# Patient Record
Sex: FEMALE | Race: WHITE | NOT HISPANIC OR LATINO | Employment: OTHER
[De-identification: names, ages, dates, MRNs, and addresses within clinical notes are randomized per-mention and may not be internally consistent; named-entity substitution may affect disease eponyms.]

---

## 2017-11-03 ENCOUNTER — HISTORIC RESULTS (OUTPATIENT)
Dept: ADMINISTRATIVE | Age: 72
End: 2017-11-03

## 2017-11-03 ENCOUNTER — TRANSFERRED RECORDS (OUTPATIENT)
Dept: HEALTH INFORMATION MANAGEMENT | Facility: CLINIC | Age: 72
End: 2017-11-03

## 2021-06-20 ENCOUNTER — HOSPITAL ENCOUNTER (OUTPATIENT)
Facility: CLINIC | Age: 76
Setting detail: OBSERVATION
Discharge: HOME | End: 2021-06-21
Attending: EMERGENCY MEDICINE | Admitting: STUDENT IN AN ORGANIZED HEALTH CARE EDUCATION/TRAINING PROGRAM
Payer: COMMERCIAL

## 2021-06-20 ENCOUNTER — APPOINTMENT (OUTPATIENT)
Dept: GENERAL RADIOLOGY | Facility: CLINIC | Age: 76
End: 2021-06-20
Attending: EMERGENCY MEDICINE
Payer: COMMERCIAL

## 2021-06-20 DIAGNOSIS — R06.00 DYSPNEA, UNSPECIFIED TYPE: ICD-10-CM

## 2021-06-20 DIAGNOSIS — R53.1 WEAKNESS: ICD-10-CM

## 2021-06-20 DIAGNOSIS — J44.1 COPD EXACERBATION (H): ICD-10-CM

## 2021-06-20 LAB
ALBUMIN SERPL-MCNC: 3.7 G/DL (ref 3.4–5)
ALP SERPL-CCNC: 54 U/L (ref 40–150)
ALT SERPL W P-5'-P-CCNC: 124 U/L (ref 0–50)
ANION GAP SERPL CALCULATED.3IONS-SCNC: 6 MMOL/L (ref 3–14)
AST SERPL W P-5'-P-CCNC: 75 U/L (ref 0–45)
BASOPHILS # BLD AUTO: 0.1 10E9/L (ref 0–0.2)
BASOPHILS NFR BLD AUTO: 0.8 %
BILIRUB SERPL-MCNC: 0.4 MG/DL (ref 0.2–1.3)
BUN SERPL-MCNC: 12 MG/DL (ref 7–30)
CALCIUM SERPL-MCNC: 9.1 MG/DL (ref 8.5–10.1)
CHLORIDE SERPL-SCNC: 105 MMOL/L (ref 94–109)
CO2 SERPL-SCNC: 26 MMOL/L (ref 20–32)
CREAT SERPL-MCNC: 0.78 MG/DL (ref 0.52–1.04)
DIFFERENTIAL METHOD BLD: NORMAL
EOSINOPHIL # BLD AUTO: 0.7 10E9/L (ref 0–0.7)
EOSINOPHIL NFR BLD AUTO: 11.8 %
ERYTHROCYTE [DISTWIDTH] IN BLOOD BY AUTOMATED COUNT: 11.8 % (ref 10–15)
GFR SERPL CREATININE-BSD FRML MDRD: 75 ML/MIN/{1.73_M2}
GLUCOSE SERPL-MCNC: 112 MG/DL (ref 70–99)
HCT VFR BLD AUTO: 44.8 % (ref 35–47)
HGB BLD-MCNC: 14.7 G/DL (ref 11.7–15.7)
IMM GRANULOCYTES # BLD: 0 10E9/L (ref 0–0.4)
IMM GRANULOCYTES NFR BLD: 0.3 %
LABORATORY COMMENT REPORT: NORMAL
LYMPHOCYTES # BLD AUTO: 1.6 10E9/L (ref 0.8–5.3)
LYMPHOCYTES NFR BLD AUTO: 26 %
MCH RBC QN AUTO: 31.8 PG (ref 26.5–33)
MCHC RBC AUTO-ENTMCNC: 32.8 G/DL (ref 31.5–36.5)
MCV RBC AUTO: 97 FL (ref 78–100)
MONOCYTES # BLD AUTO: 0.6 10E9/L (ref 0–1.3)
MONOCYTES NFR BLD AUTO: 8.9 %
NEUTROPHILS # BLD AUTO: 3.2 10E9/L (ref 1.6–8.3)
NEUTROPHILS NFR BLD AUTO: 52.2 %
NRBC # BLD AUTO: 0 10*3/UL
NRBC BLD AUTO-RTO: 0 /100
NT-PROBNP SERPL-MCNC: 67 PG/ML (ref 0–900)
PLATELET # BLD AUTO: 212 10E9/L (ref 150–450)
POTASSIUM SERPL-SCNC: 4.4 MMOL/L (ref 3.4–5.3)
PROT SERPL-MCNC: 8.2 G/DL (ref 6.8–8.8)
RBC # BLD AUTO: 4.62 10E12/L (ref 3.8–5.2)
SARS-COV-2 RNA RESP QL NAA+PROBE: NEGATIVE
SODIUM SERPL-SCNC: 137 MMOL/L (ref 133–144)
SPECIMEN SOURCE: NORMAL
TROPONIN I SERPL-MCNC: <0.015 UG/L (ref 0–0.04)
WBC # BLD AUTO: 6.2 10E9/L (ref 4–11)

## 2021-06-20 PROCEDURE — 85025 COMPLETE CBC W/AUTO DIFF WBC: CPT | Performed by: EMERGENCY MEDICINE

## 2021-06-20 PROCEDURE — 250N000009 HC RX 250: Performed by: STUDENT IN AN ORGANIZED HEALTH CARE EDUCATION/TRAINING PROGRAM

## 2021-06-20 PROCEDURE — 83880 ASSAY OF NATRIURETIC PEPTIDE: CPT | Performed by: EMERGENCY MEDICINE

## 2021-06-20 PROCEDURE — 99220 PR INITIAL OBSERVATION CARE,LEVEL III: CPT | Performed by: STUDENT IN AN ORGANIZED HEALTH CARE EDUCATION/TRAINING PROGRAM

## 2021-06-20 PROCEDURE — 96374 THER/PROPH/DIAG INJ IV PUSH: CPT | Performed by: STUDENT IN AN ORGANIZED HEALTH CARE EDUCATION/TRAINING PROGRAM

## 2021-06-20 PROCEDURE — 96376 TX/PRO/DX INJ SAME DRUG ADON: CPT | Performed by: STUDENT IN AN ORGANIZED HEALTH CARE EDUCATION/TRAINING PROGRAM

## 2021-06-20 PROCEDURE — 94640 AIRWAY INHALATION TREATMENT: CPT

## 2021-06-20 PROCEDURE — 250N000011 HC RX IP 250 OP 636: Performed by: EMERGENCY MEDICINE

## 2021-06-20 PROCEDURE — 87635 SARS-COV-2 COVID-19 AMP PRB: CPT | Performed by: EMERGENCY MEDICINE

## 2021-06-20 PROCEDURE — 250N000009 HC RX 250: Performed by: EMERGENCY MEDICINE

## 2021-06-20 PROCEDURE — 94640 AIRWAY INHALATION TREATMENT: CPT | Performed by: STUDENT IN AN ORGANIZED HEALTH CARE EDUCATION/TRAINING PROGRAM

## 2021-06-20 PROCEDURE — G0378 HOSPITAL OBSERVATION PER HR: HCPCS

## 2021-06-20 PROCEDURE — 80053 COMPREHEN METABOLIC PANEL: CPT | Performed by: EMERGENCY MEDICINE

## 2021-06-20 PROCEDURE — 99285 EMERGENCY DEPT VISIT HI MDM: CPT | Mod: 25 | Performed by: STUDENT IN AN ORGANIZED HEALTH CARE EDUCATION/TRAINING PROGRAM

## 2021-06-20 PROCEDURE — 250N000013 HC RX MED GY IP 250 OP 250 PS 637: Performed by: STUDENT IN AN ORGANIZED HEALTH CARE EDUCATION/TRAINING PROGRAM

## 2021-06-20 PROCEDURE — C9803 HOPD COVID-19 SPEC COLLECT: HCPCS

## 2021-06-20 PROCEDURE — 999N000157 HC STATISTIC RCP TIME EA 10 MIN

## 2021-06-20 PROCEDURE — 96375 TX/PRO/DX INJ NEW DRUG ADDON: CPT | Performed by: STUDENT IN AN ORGANIZED HEALTH CARE EDUCATION/TRAINING PROGRAM

## 2021-06-20 PROCEDURE — 71046 X-RAY EXAM CHEST 2 VIEWS: CPT

## 2021-06-20 PROCEDURE — 84484 ASSAY OF TROPONIN QUANT: CPT | Performed by: EMERGENCY MEDICINE

## 2021-06-20 RX ORDER — METHYLPREDNISOLONE SODIUM SUCCINATE 125 MG/2ML
125 INJECTION, POWDER, LYOPHILIZED, FOR SOLUTION INTRAMUSCULAR; INTRAVENOUS ONCE
Status: COMPLETED | OUTPATIENT
Start: 2021-06-20 | End: 2021-06-20

## 2021-06-20 RX ORDER — ACETAMINOPHEN 650 MG/1
650 SUPPOSITORY RECTAL EVERY 4 HOURS PRN
Status: DISCONTINUED | OUTPATIENT
Start: 2021-06-20 | End: 2021-06-21 | Stop reason: HOSPADM

## 2021-06-20 RX ORDER — ACETAMINOPHEN 325 MG/1
650 TABLET ORAL EVERY 4 HOURS PRN
Status: DISCONTINUED | OUTPATIENT
Start: 2021-06-20 | End: 2021-06-21 | Stop reason: HOSPADM

## 2021-06-20 RX ORDER — ONDANSETRON 2 MG/ML
4 INJECTION INTRAMUSCULAR; INTRAVENOUS EVERY 30 MIN PRN
Status: DISCONTINUED | OUTPATIENT
Start: 2021-06-20 | End: 2021-06-20

## 2021-06-20 RX ORDER — ONDANSETRON 4 MG/1
4 TABLET, ORALLY DISINTEGRATING ORAL EVERY 6 HOURS PRN
Status: DISCONTINUED | OUTPATIENT
Start: 2021-06-20 | End: 2021-06-21 | Stop reason: HOSPADM

## 2021-06-20 RX ORDER — ALBUTEROL SULFATE 90 UG/1
1-2 AEROSOL, METERED RESPIRATORY (INHALATION) EVERY 4 HOURS PRN
COMMUNITY
Start: 2021-06-03

## 2021-06-20 RX ORDER — IPRATROPIUM BROMIDE AND ALBUTEROL SULFATE 2.5; .5 MG/3ML; MG/3ML
3 SOLUTION RESPIRATORY (INHALATION)
Status: DISCONTINUED | OUTPATIENT
Start: 2021-06-20 | End: 2021-06-20

## 2021-06-20 RX ORDER — VITAMIN B COMPLEX
1 TABLET ORAL DAILY
COMMUNITY

## 2021-06-20 RX ORDER — MAGNESIUM OXIDE 400 MG/1
400 TABLET ORAL EVERY EVENING
COMMUNITY

## 2021-06-20 RX ORDER — ONDANSETRON 2 MG/ML
4 INJECTION INTRAMUSCULAR; INTRAVENOUS EVERY 6 HOURS PRN
Status: DISCONTINUED | OUTPATIENT
Start: 2021-06-20 | End: 2021-06-21 | Stop reason: HOSPADM

## 2021-06-20 RX ORDER — IPRATROPIUM BROMIDE AND ALBUTEROL SULFATE 2.5; .5 MG/3ML; MG/3ML
3 SOLUTION RESPIRATORY (INHALATION) EVERY 4 HOURS
Status: DISCONTINUED | OUTPATIENT
Start: 2021-06-21 | End: 2021-06-20

## 2021-06-20 RX ORDER — BUSPIRONE HYDROCHLORIDE 10 MG/1
10 TABLET ORAL 2 TIMES DAILY PRN
COMMUNITY

## 2021-06-20 RX ORDER — PREDNISONE 20 MG/1
TABLET ORAL
Qty: 8 TABLET | Refills: 0 | Status: SHIPPED | OUTPATIENT
Start: 2021-06-20 | End: 2021-06-21

## 2021-06-20 RX ORDER — PREDNISONE 20 MG/1
40 TABLET ORAL DAILY
Status: DISCONTINUED | OUTPATIENT
Start: 2021-06-21 | End: 2021-06-21 | Stop reason: HOSPADM

## 2021-06-20 RX ORDER — IPRATROPIUM BROMIDE AND ALBUTEROL SULFATE 2.5; .5 MG/3ML; MG/3ML
1 SOLUTION RESPIRATORY (INHALATION) EVERY 6 HOURS PRN
Qty: 100 ML | Refills: 0 | Status: SHIPPED | OUTPATIENT
Start: 2021-06-20 | End: 2021-06-21

## 2021-06-20 RX ORDER — AZITHROMYCIN 250 MG/1
500 TABLET, FILM COATED ORAL ONCE
Status: COMPLETED | OUTPATIENT
Start: 2021-06-20 | End: 2021-06-20

## 2021-06-20 RX ORDER — ALBUTEROL SULFATE 90 UG/1
1-2 AEROSOL, METERED RESPIRATORY (INHALATION)
Status: DISCONTINUED | OUTPATIENT
Start: 2021-06-20 | End: 2021-06-21 | Stop reason: HOSPADM

## 2021-06-20 RX ORDER — IPRATROPIUM BROMIDE AND ALBUTEROL SULFATE 2.5; .5 MG/3ML; MG/3ML
3 SOLUTION RESPIRATORY (INHALATION) EVERY 4 HOURS
Status: DISCONTINUED | OUTPATIENT
Start: 2021-06-20 | End: 2021-06-21 | Stop reason: HOSPADM

## 2021-06-20 RX ORDER — ALBUTEROL SULFATE 90 UG/1
1-2 AEROSOL, METERED RESPIRATORY (INHALATION) EVERY 4 HOURS PRN
Status: DISCONTINUED | OUTPATIENT
Start: 2021-06-20 | End: 2021-06-20

## 2021-06-20 RX ORDER — IPRATROPIUM BROMIDE AND ALBUTEROL SULFATE 2.5; .5 MG/3ML; MG/3ML
3 SOLUTION RESPIRATORY (INHALATION) ONCE
Status: COMPLETED | OUTPATIENT
Start: 2021-06-20 | End: 2021-06-20

## 2021-06-20 RX ORDER — AZITHROMYCIN 250 MG/1
250 TABLET, FILM COATED ORAL DAILY
Status: DISCONTINUED | OUTPATIENT
Start: 2021-06-21 | End: 2021-06-21 | Stop reason: HOSPADM

## 2021-06-20 RX ADMIN — Medication 1 MG: at 20:44

## 2021-06-20 RX ADMIN — ONDANSETRON 4 MG: 2 INJECTION INTRAMUSCULAR; INTRAVENOUS at 17:08

## 2021-06-20 RX ADMIN — IPRATROPIUM BROMIDE AND ALBUTEROL SULFATE 3 ML: .5; 3 SOLUTION RESPIRATORY (INHALATION) at 21:33

## 2021-06-20 RX ADMIN — METHYLPREDNISOLONE SODIUM SUCCINATE 125 MG: 125 INJECTION, POWDER, FOR SOLUTION INTRAMUSCULAR; INTRAVENOUS at 15:10

## 2021-06-20 RX ADMIN — ONDANSETRON 4 MG: 2 INJECTION INTRAMUSCULAR; INTRAVENOUS at 14:21

## 2021-06-20 RX ADMIN — ACETAMINOPHEN 650 MG: 325 TABLET, FILM COATED ORAL at 20:47

## 2021-06-20 RX ADMIN — IPRATROPIUM BROMIDE AND ALBUTEROL SULFATE 3 ML: .5; 3 SOLUTION RESPIRATORY (INHALATION) at 14:18

## 2021-06-20 RX ADMIN — AZITHROMYCIN MONOHYDRATE 500 MG: 250 TABLET ORAL at 20:44

## 2021-06-20 ASSESSMENT — MIFFLIN-ST. JEOR: SCORE: 1168.14

## 2021-06-20 ASSESSMENT — ENCOUNTER SYMPTOMS
CHEST TIGHTNESS: 0
SLEEP DISTURBANCE: 1
FATIGUE: 1
SHORTNESS OF BREATH: 1
WEAKNESS: 1
NAUSEA: 1

## 2021-06-20 NOTE — ED TRIAGE NOTES
"Pt has been having SOB for last few weeks but increasingly worse the last few days. Pt states \"any movement makes me lux and puff\".   "

## 2021-06-20 NOTE — PHARMACY-ADMISSION MEDICATION HISTORY
Pharmacy Medication History  Admission medication history interview status for the 6/20/2021  admission is complete. See EPIC admission navigator for prior to admission medications     Location of Interview: Patient room  Medication history sources: Patient, Surescripts and Care Everywhere    Significant changes made to the medication list:  Added albuterol, buspirone, dorzolamide-timolol, calcium, magnesium, vitamin D  Removed: simvastatin, nystatin-triamcinolone, fluocinonide    In the past week, patient estimated taking medication this percent of the time: greater than 90%    Additional medication history information:   None    Medication reconciliation completed by provider prior to medication history? No    Time spent in this activity: 15 minutes     Prior to Admission medications    Medication Sig Last Dose Taking? Auth Provider   albuterol (PROAIR HFA/PROVENTIL HFA/VENTOLIN HFA) 108 (90 Base) MCG/ACT inhaler Inhale 1-2 puffs into the lungs every 4 hours as needed for shortness of breath / dyspnea 6/20/2021 at Unknown time Yes Unknown, Entered By History   busPIRone (BUSPAR) 10 MG tablet Take 10 mg by mouth 2 times daily 6/20/2021 at 5 mg, took half tab Yes Unknown, Entered By History   calcium carbonate (OS-MAMIE) 1500 (600 Ca) MG tablet Take 600 mg by mouth daily 6/20/2021 at Unknown time Yes Unknown, Entered By History   dorzolamide-timolol (COSOPT) 2-0.5 % ophthalmic solution Place 1 drop into both eyes 2 times daily  6/20/2021 at x1 Yes Reported, Patient   ipratropium - albuterol 0.5 mg/2.5 mg/3 mL (DUONEB) 0.5-2.5 (3) MG/3ML neb solution Take 1 vial (3 mLs) by nebulization every 6 hours as needed for shortness of breath / dyspnea or wheezing  Yes Catalino Lloyd MD   latanoprost (XALATAN) 0.005 % ophthalmic solution Place 1 drop into both eyes At Bedtime. 6/19/2021 at Unknown time Yes Reported, Patient   levothyroxine (SSYNTHROID,LEVOTHROID) 100 MCG tablet Take 1 tablet by mouth daily. 6/20/2021 at  Unknown time Yes Leslee Orozco NP   magnesium oxide (MAG-OX) 400 MG tablet Take 400 mg by mouth daily 6/20/2021 at Unknown time Yes Unknown, Entered By History   predniSONE (DELTASONE) 20 MG tablet Take two tablets (= 40mg) each day for 4 (four) days  Yes Catalino Lloyd MD   vitamin D3 (CHOLECALCIFEROL) 50 mcg (2000 units) tablet Take 1 tablet by mouth At Bedtime 6/19/2021 at Unknown time Yes Unknown, Entered By History     The information provided in this note is only as accurate as the sources available at the time of update(s)     Patricia Jade, PharmD, BCPS

## 2021-06-20 NOTE — ED PROVIDER NOTES
History   Chief Complaint:  Shortness of Breath       The history is provided by the patient.      Naina Zamora is a 75 year old female with history of COPD from chronic bronchitis, hypercholesterolemia, hyperlipidemia and hypothyroidism who presents with shortness of breath. She reports worsened shortness of breath yesterday, with difficulty throughout the night. She felt better this morning, but stayed in bed longer than normal because she felt tired and weak. The patient reports shortness of breath coming back today and says that it is much more severe, noting that it is worse when laying flat. She describes difficulty taking deep breaths, but does not report pain, and she also reports some nausea today. The patient reports recent respiratory issues and coughing, for which she was prescribed albuterol a couple weeks ago. She has been using albuterol and it helped for a while, but doesn't seem to be making as much of a difference today. She reports using more albuterol last night, about every couple hours. The patient also describes trying other remedies including Vicks, steam, and lemon and honey water today, but nothing seemed to help. The patient denies pain with breathing, as well as any chest pain or tightness. She mentions liver issues for years from statin, past hepatis A, and possibly drinking, which she has quit for a couple weeks. She reports marijuana use, but denies any tobacco use since quitting smoking around 40 years ago. The patient also notes taking a half tablet of Buspar, prescribed for her anxiety, about an hour or two ago. She reports lower blood pressure being normal, noting 106/56 the other day.       Review of Systems   Constitutional: Positive for fatigue.   Respiratory: Positive for shortness of breath. Negative for chest tightness.    Cardiovascular: Negative for chest pain.   Gastrointestinal: Positive for nausea.   Neurological: Positive for weakness.   Psychiatric/Behavioral:  "Positive for sleep disturbance.   All other systems reviewed and are negative.    Allergies:  Latex  Statins    Medications:  Albuterol  Deltasone  Dorzolamide  Lidex  Xalatan  Synthroid, Levothroid  Zocor  Buspar    Past Medical History:    Eczema  Elevated lipids  Glaucoma  Hypothyroidism  Fatty liver  JORGE  Hypercholesterolemia  Hyperlipidemia  Alcohol abuse  COPD     Past Surgical History:     section  Cholecystectomy  Dilation and curettage     Family History:    CAD  Diabetes  Hypertension  Alzheimer disease    Social History:  The patient present with her daughter and daughter-in-law.  The patient lives alone.    Physical Exam     Patient Vitals for the past 24 hrs:   BP Temp Temp src Pulse Resp SpO2 Height Weight   21 1715 -- -- -- -- -- 94 % -- --   21 1705 -- -- -- -- -- (!) 88 % -- --   21 1615 -- -- -- -- -- 92 % -- --   21 1600 -- -- -- -- -- 91 % -- --   21 1530 (!) 163/75 -- -- -- -- 92 % -- --   21 1310 (!) 167/70 98.1  F (36.7  C) Temporal 67 20 94 % 1.549 m (5' 1\") 72.6 kg (160 lb)       Physical Exam  General: Alert and Interactive.   Head: No signs of trauma.   Mouth/Throat: Oropharynx is clear and moist.   Eyes: Conjunctivae are normal. Pupils are equal, round, and reactive to light.   Neck: Normal range of motion. No nuchal rigidity.   CV: Normal rate and regular rhythm.    Resp: Effort normal and breath sounds normal. No respiratory distress.   GI: Soft. There is no tenderness or guarding.   MSK: Normal range of motion. no edema.   Neuro: The patient is alert and oriented to person, place, and time.  PERRLA, EOMI, strength in upper/lower extremities normal and symmetrical.   Sensation normal. Speech normal.  GCS eye subscore is 4. GCS verbal subscore is 5. GCS motor subscore is 6.   Skin: Skin is warm and dry. No rash noted.   Psych: normal mood and affect. behavior is normal.       Emergency Department Course     Imaging:  XR Chest 2 Views  No " radiographic evidence of acute chest abnormality.   As per radiology.     Laboratory:   CBC: WBC 6.2, HGB 14.7,    CMP: Glucose 112 (H),  (H), AST 75(H) o/w WNL (Creatinine 0.78)    Troponin (Collected 1342): <0.015  BNP: 67    Asymptomatic COVID19 Virus PCR by nasopharyngeal swab: pending      Emergency Department Course:    Reviewed:  I reviewed nursing notes, vitals, past medical history and care everywhere    Assessments:  1319 Yoel Kim, medical student, assessed the patient and reported findings to me.  1432 Yoel rechecked the patient and reported findings.  1530 Yoel rechecked the patient and reported findings.  1555 I assessed the patient. The patient was road tested and became lightheaded, cold, and nauseated. Plan will be for observation for symptom resolution.    Consults:  1714 I spoke with Dr. Tamez, hospitalist, about the patient. They agreed to admit the patient.    Interventions:  1418 DuoNeb 3mL Nebulizer  1421 Zofran 4mg IV  1510 solu-medrol 125mg IV    Disposition:  The patient was admitted to the hospital under the care of Dr. Tamez.       Impression & Plan   Medical Decision Making:   Naina Zamora is a 75 year old female who presents for evaluation of shortness of breath.  Signs and symptoms are consistent with COPD exacerbation.  A broad differential was considered including foreign body, reactive airway disease, pneumothorax, cardiac equivalent/ACS, viral induced wheezing, allergic phenomena, pneumonia, etc.  Patient feels improved after interventions here in ED but continues to have impairment in respiratory function.  Given this, I will hospitalize for further intervention.  There are no signs at this point of any other serious etiologies including those mentioned above especially acute coronary syndrome. I doubt this is ACS given the classic story of COPD exacerbation given by the patient and the marked wheezing.  No signs of pneumonia. Troponin normal here.     Initially had plan to discharge the patient with outpatient steroids and nebulizer for DuoNeb.  However, the patient did not do well ambulating stating that she felt too weak to go home and her family thought that this would be unsafe to send her home at this point.  We will bring her into the hospital under observation for further treatment.    Covid-19  Naina Zamora was evaluated during a global COVID-19 pandemic, which necessitated consideration that the patient might be at risk for infection with the SARS-CoV-2 virus that causes COVID-19.   Applicable protocols for evaluation were followed during the patient's care.   COVID-19 was considered as part of the patient's evaluation. The plan for testing is:  a test was obtained during this visit.    Diagnosis:    ICD-10-CM    1. Dyspnea, unspecified type  R06.00 Asymptomatic SARS-CoV-2 COVID-19 Virus (Coronavirus) by PCR   2. COPD exacerbation (H)  J44.1    3. Weakness  R53.1        Scribe Disclosure:  I, Zofia Rothman, am serving as a scribe at 1:19 PM on 6/20/2021 to document services personally performed by Catalino Lloyd MD based on my observations and the provider's statements to me.        Catalino Lloyd MD  06/20/21 7762

## 2021-06-20 NOTE — ED NOTES
"[facility]  ED Nurse Handoff Report    ED Chief complaint: Shortness of Breath      ED Diagnosis:   Final diagnoses:   Dyspnea, unspecified type   COPD exacerbation (H)       Code Status: Full Code    Allergies:   Allergies   Allergen Reactions     Latex Rash       Patient Story: Pt presents w/SOB. Hx of COPD. Does not have neb machine at home. Was going to discharge pt with neb machine and prescriptions. During ambulation trial pt reports feeling \"woozy\" and \"weak\" and states she does not feel safe going home.   Focused Assessment:  A&Ox4, VSS, ambulates independently without difficulty. Lung sounds diminished. NSR.     Treatments and/or interventions provided: solumedrol.   Patient's response to treatments and/or interventions: NA    To be done/followed up on inpatient unit:  see inpatient orders    Does this patient have any cognitive concerns?: none    Activity level - Baseline/Home:  Independent  Activity Level - Current:   Independent    Patient's Preferred language: English   Needed?: No    Isolation: None  Infection: Not Applicable  Patient tested for COVID 19 prior to admission: yes  Bariatric?: No    Vital Signs:   Vitals:    06/20/21 1310   BP: (!) 167/70   Pulse: 67   Resp: 20   Temp: 98.1  F (36.7  C)   TempSrc: Temporal   SpO2: 94%   Weight: 72.6 kg (160 lb)   Height: 1.549 m (5' 1\")       Cardiac Rhythm:     Was the PSS-3 completed:   Yes  What interventions are required if any?               Family Comments: daughter present  OBS brochure/video discussed/provided to patient/family: Yes              Name of person given brochure if not patient: self              Relationship to patient: NA    For the majority of the shift this patient's behavior was Green.   Behavioral interventions performed were NA.    ED NURSE PHONE NUMBER: [phone]         "

## 2021-06-21 VITALS
HEART RATE: 86 BPM | WEIGHT: 160 LBS | HEIGHT: 61 IN | OXYGEN SATURATION: 93 % | RESPIRATION RATE: 22 BRPM | TEMPERATURE: 98.1 F | SYSTOLIC BLOOD PRESSURE: 130 MMHG | DIASTOLIC BLOOD PRESSURE: 60 MMHG | BODY MASS INDEX: 30.21 KG/M2

## 2021-06-21 PROCEDURE — 999N000157 HC STATISTIC RCP TIME EA 10 MIN

## 2021-06-21 PROCEDURE — 250N000009 HC RX 250: Performed by: STUDENT IN AN ORGANIZED HEALTH CARE EDUCATION/TRAINING PROGRAM

## 2021-06-21 PROCEDURE — 250N000013 HC RX MED GY IP 250 OP 250 PS 637: Performed by: STUDENT IN AN ORGANIZED HEALTH CARE EDUCATION/TRAINING PROGRAM

## 2021-06-21 PROCEDURE — 99217 PR OBSERVATION CARE DISCHARGE: CPT | Performed by: INTERNAL MEDICINE

## 2021-06-21 PROCEDURE — 94640 AIRWAY INHALATION TREATMENT: CPT | Mod: 76

## 2021-06-21 PROCEDURE — 999N000147 HC STATISTIC PT IP EVAL DEFER

## 2021-06-21 PROCEDURE — 250N000012 HC RX MED GY IP 250 OP 636 PS 637: Performed by: STUDENT IN AN ORGANIZED HEALTH CARE EDUCATION/TRAINING PROGRAM

## 2021-06-21 PROCEDURE — G0378 HOSPITAL OBSERVATION PER HR: HCPCS

## 2021-06-21 RX ORDER — PREDNISONE 10 MG/1
TABLET ORAL
Qty: 20 TABLET | Refills: 0 | Status: ON HOLD | OUTPATIENT
Start: 2021-06-21 | End: 2021-07-22

## 2021-06-21 RX ORDER — IPRATROPIUM BROMIDE AND ALBUTEROL SULFATE 2.5; .5 MG/3ML; MG/3ML
1 SOLUTION RESPIRATORY (INHALATION) EVERY 6 HOURS PRN
Qty: 100 ML | Refills: 0 | Status: ON HOLD | OUTPATIENT
Start: 2021-06-21 | End: 2021-07-22

## 2021-06-21 RX ORDER — AZITHROMYCIN 250 MG/1
250 TABLET, FILM COATED ORAL DAILY
Qty: 3 TABLET | Refills: 0 | Status: ON HOLD | OUTPATIENT
Start: 2021-06-22 | End: 2021-07-22

## 2021-06-21 RX ADMIN — IPRATROPIUM BROMIDE AND ALBUTEROL SULFATE 3 ML: .5; 3 SOLUTION RESPIRATORY (INHALATION) at 14:04

## 2021-06-21 RX ADMIN — IPRATROPIUM BROMIDE AND ALBUTEROL SULFATE 3 ML: .5; 3 SOLUTION RESPIRATORY (INHALATION) at 05:56

## 2021-06-21 RX ADMIN — PREDNISONE 40 MG: 20 TABLET ORAL at 07:36

## 2021-06-21 RX ADMIN — AZITHROMYCIN MONOHYDRATE 250 MG: 250 TABLET ORAL at 07:36

## 2021-06-21 RX ADMIN — IPRATROPIUM BROMIDE AND ALBUTEROL SULFATE 3 ML: .5; 3 SOLUTION RESPIRATORY (INHALATION) at 09:54

## 2021-06-21 RX ADMIN — ACETAMINOPHEN 650 MG: 325 TABLET, FILM COATED ORAL at 05:40

## 2021-06-21 NOTE — PLAN OF CARE
PT: Orders received, chart reviewed, discussed with RN in rounds. Pt admitted under observation status with a COPD exacerbation and is now feeling well. Pt ambulated in the hallway with nursing, O2 sats stable, balance was steady and it was observed by PT. No concerns from nursing. Pt has no skilled PT needs at this time. PT to complete orders.

## 2021-06-21 NOTE — PROGRESS NOTES
Patient has been assessed for Home Oxygen needs. Oxygen readings:    *Pulse oximetry (SpO2) = 93% on room air at rest while awake.    *SpO2 improved to 98% on 1 liters/minute at rest.    *SpO2 = 86-92% on room air during activity/with exercise.    *SpO2 improved to 92% on 0 liters/minute during activity/with exercise.

## 2021-06-21 NOTE — H&P
[facility]    History and Physical - Hospitalist Service       Date of Admission:  6/20/2021    Assessment & Plan   Naina Zamora is a 75 year old female with past medical history significant for COBD, HLD, hypothyroidism, former tobacco use admitted on 6/20/2021 with shortness of breath.     Shortness of breath   Probable COPD exacerbation   Generalized weakness   Pt presented to the ED with shortness of breath and weakness. Pt has hx of COPD but no prior hospitalizations for exacerbation. She started having some mild a few weeks ago and was prescribed albuterol. despite the albuterol symptoms progressed. Now quite short of breath the last two days with cough and increased sputum production. O2 sats are in the low 90s on room air. Laboratory evaluation is unremarkable with normal trop and BNP. CXR shows no acute abnormality. Pt is a former smoker and currently smokes marijuana.   - Continue Duobebs Q4H scheduled and albuterol Q2H PRN   - Continue Prednisone 40mg daily   - Azithromycin x5 days  - Continuous oximetry, and supplemental O2 as need   - Respiratory therapy consult   - Smoking cessation  - PT consult     Elevated LFTs   Hx Fatty liver vs alcoholic liver disease   LFTs slightly elevated with ALT of 124 and AST of 75.   - Monitor outpatient     Alcohol use   - monitor for evidence of withdrawal.     Mood disorder   - Buspar PTA, hold while on obs.     Hypothyroidism   - Levothyroxine PTA, hold while on obs.     Diet: Regular Diet Adult    DVT Prophylaxis: Low Risk/Ambulatory with no VTE prophylaxis indicated  Fragoso Catheter: not present  Code Status: Full Code           Disposition Plan   Expected discharge: Tomorrow, recommended to prior living arrangement once safe disposition plan/ TCU bed available and dyspnea improved. .  Entered: Zeynep Tamez MD 06/20/2021, 7:43 PM     The patient's care was discussed with the Patient.    Zeynep Tamez MD  [facility]  [facility]  Contact information available via [facility] Paging/Directory      ______________________________________________________________________    Chief Complaint   Shortness of breath     History is obtained from the patient    History of Present Illness   Naina Zamora is a 75 year old female who presented to the ED with shortness of breath. Her symptoms started yesterday, but has been progressing throughout the night and day today. She also also felt more tired and weak than normal. She has been using her albuterol inhaler, but reports that this has not helped much. She does have a hx of COPD, but denies hx of exacerbations. She has been outside much more this past week than she has in over a year, so thinks that there may be some sort of environmental allergen that is contributing. She has been coughing frequently with sputum production.     In the ED she was hemodynamically stable and satting in the low 90s on room air. Her CXR was normal. Laboratory evaluation was also fairly unremarkable. She was treated with duonebs, and IV solumedrol for possible COPD exacerbating. She reports feeling better after these interventions but still weak and short of breath.     Pt is being admitted to observation for further care. She is feeling better after the duoneb but still feels quite short of breath.     Review of Systems    The 10 point Review of Systems is negative other than noted in the HPI or here.     Past Medical History    I have reviewed this patient's medical history and updated it with pertinent information if needed.   Past Medical History:   Diagnosis Date     Eczema      Elevated lipids      Glaucoma      Hypothyroidism        Past Surgical History   I have reviewed this patient's surgical history and updated it with pertinent information if needed.  Past Surgical History:   Procedure Laterality Date      SECTION       CHOLECYSTECTOMY       DILATION AND CURETTAGE         Social History    I have reviewed this patient's social history and updated it with pertinent information if needed.  Social History     Tobacco Use     Smoking status: Never Smoker     Smokeless tobacco: Never Used   Substance Use Topics     Alcohol use: No     Drug use: No       Family History   I have reviewed this patient's family history and updated it with pertinent information if needed.  Family History   Problem Relation Age of Onset     C.A.D. Brother      C.A.D. Father      Diabetes Brother      Hypertension Brother      Alzheimer Disease Mother        Prior to Admission Medications   Prior to Admission Medications   Prescriptions Last Dose Informant Patient Reported? Taking?   albuterol (PROAIR HFA/PROVENTIL HFA/VENTOLIN HFA) 108 (90 Base) MCG/ACT inhaler 6/20/2021 at Unknown time Self Yes Yes   Sig: Inhale 1-2 puffs into the lungs every 4 hours as needed for shortness of breath / dyspnea   busPIRone (BUSPAR) 10 MG tablet 6/20/2021 at 5 mg, took half tab Self Yes Yes   Sig: Take 10 mg by mouth 2 times daily   calcium carbonate (OS-MAMIE) 1500 (600 Ca) MG tablet 6/20/2021 at Unknown time Self Yes Yes   Sig: Take 600 mg by mouth daily   dorzolamide-timolol (COSOPT) 2-0.5 % ophthalmic solution 6/20/2021 at x1 Self Yes Yes   Sig: Place 1 drop into both eyes 2 times daily    latanoprost (XALATAN) 0.005 % ophthalmic solution 6/19/2021 at Unknown time Self Yes Yes   Sig: Place 1 drop into both eyes At Bedtime.   levothyroxine (SSYNTHROID,LEVOTHROID) 100 MCG tablet 6/20/2021 at Unknown time Self No Yes   Sig: Take 1 tablet by mouth daily.   magnesium oxide (MAG-OX) 400 MG tablet 6/20/2021 at Unknown time Self Yes Yes   Sig: Take 400 mg by mouth daily   vitamin D3 (CHOLECALCIFEROL) 50 mcg (2000 units) tablet 6/19/2021 at Unknown time Self Yes Yes   Sig: Take 1 tablet by mouth At Bedtime      Facility-Administered Medications: None     Allergies   Allergies   Allergen Reactions     Latex Rash       Physical Exam   Vital Signs: Temp:  96.1  F (35.6  C) Temp src: Oral BP: (!) 174/77 Pulse: 57   Resp: 18 SpO2: 94 % O2 Device: Nasal cannula Oxygen Delivery: 2 LPM  Weight: 160 lbs 0 oz    Constitutional: Awake, alert, cooperative, no apparent distress.  Eyes: Conjunctiva and pupils examined and normal.  HEENT: Moist mucous membranes, normal dentition.  Respiratory: Clear to auscultation bilaterally, very faint scattered wheezing heard. She does have slight increased work of breathing, but is lying flat and speaking in full sentences.   Cardiovascular: Regular rate and rhythm, normal S1 and S2, and no murmur noted.  GI: Soft, non-distended, non-tender, normal bowel sounds.  Skin: No rashes, no cyanosis, no edema.  Musculoskeletal: No joint swelling, erythema or tenderness.  Neurologic: Cranial nerves 2-12 intact, normal strength and sensation.  Psychiatric: Alert, oriented to person, place and time, no obvious anxiety or depression.      Data   Data reviewed today: I reviewed all medications, new labs and imaging results over the last 24 hours. I personally reviewed the chest x-ray image(s) showing see below.    Recent Labs   Lab 06/20/21  1342   WBC 6.2   HGB 14.7   MCV 97         POTASSIUM 4.4   CHLORIDE 105   CO2 26   BUN 12   CR 0.78   ANIONGAP 6   MAMIE 9.1   *   ALBUMIN 3.7   PROTTOTAL 8.2   BILITOTAL 0.4   ALKPHOS 54   *   AST 75*   TROPI <0.015     Recent Results (from the past 24 hour(s))   XR Chest 2 Views    Narrative    CHEST TWO VIEWS 6/20/2021 2:11 PM     HISTORY: Shortness of breath.    COMPARISON: None.    FINDINGS: Heart size and pulmonary vascularity are within normal  limits. The lungs are clear. No pneumothorax or pleural effusion.       Impression    IMPRESSION: No radiographic evidence of acute chest abnormality.     ESTELA MOTA MD

## 2021-06-21 NOTE — PROGRESS NOTES
MD Notification    Notified Person: PA    Notified Person Name: Sean Chao    Notification Date/Time: 6/21/21 8404    Notification Interaction: text page    Purpose of Notification: pt comfortable d/c today. daughter to stay with her tonight. doesn't have prednisone or duoneb meds, will need orders to have those filled here.     Orders Received: awaiting callback/orders

## 2021-06-21 NOTE — PROGRESS NOTES
Observation Goals:    -diagnostic tests and consults completed and resulted - not met  -vital signs normal or at patient baseline - not met  -returns to baseline functional status - not met  -safe disposition plan has been identified - not met

## 2021-06-21 NOTE — PROGRESS NOTES
Patient has been assessed for Home Oxygen needs. Oxygen readings:    *Pulse oximetry (SpO2) = 93% on room air at rest while awake.    *SpO2 improved to 98% on 1 liters/minute at rest.    *SpO2 = 86-92% on room air during activity/with exercise.    *SpO2 improved to 92% on 0 liters/minute during activity/with exercise.    Pt ambulated 2 laps around observation unit. During 2nd lap pt de-satted down to 86% w/o change in RR. We stopped walking and took 5 deep breaths, pt O2 increased to 92%. Pt remained above 88% remainder of walk and ws 92-93% upon return to her room.

## 2021-06-21 NOTE — PROGRESS NOTES
OBSERVATION GOALS    -diagnostic tests and consults completed and resulted: NOT MET; RT to see about neb education    -vital signs normal or at patient baseline: MET    -returns to baseline functional status: IN PROGRESS. Ambulating in hallway SBA but c/o feeling weaker than normal.    -safe disposition plan has been identified: MET; pt to return back home when medically cleared for discharge

## 2021-06-21 NOTE — PROGRESS NOTES
-diagnostic tests and consults completed and resulted. NOT MET  -vital signs normal or at patient baseline. NOT MET  -returns to baseline functional status. NOT MET  -safe disposition plan has been identified. NOT MET    Nurse to notify provider when observation goals have been met and patient is ready for discharge.

## 2021-06-21 NOTE — PROGRESS NOTES
Alert and Oriented x 4. SBA, pt refused GB. 2L NC. Pt complained of 2/10 pain due to headache, Tylenol given. Melatonin given for sleep. Discharge pending.    -diagnostic tests and consults completed and resulted. NOT MET  -vital signs normal or at patient baseline. NOT MET  -returns to baseline functional status. NOT MET  -safe disposition plan has been identified. NOT MET    Nurse to notify provider when observation goals have been met and patient is ready for discharge.

## 2021-06-21 NOTE — PROGRESS NOTES
MD Notification    Notified Person: PA    Notified Person Name: Sean Chao    Notification Date/Time: 1130 6/21/21    Notification Interaction: text page / in person    Purpose of Notification: Per RT, diagnosis code needed for nebulizer for insurance purposes.     Orders Received: PA to enter diagnosis code    Comments: RT providing nebulizer education to pt

## 2021-06-21 NOTE — DISCHARGE SUMMARY
[facility]    Discharge Summary  Hospitalist    Date of Admission:  6/20/2021  Date of Discharge:  6/21/2021  Discharging Provider: Sean Chao  Date of Service (when I saw the patient): 06/21/21    Discharge Diagnoses   1. Shortness of breath and mild acute hypoxemic respiratory failure due to COPD exacerbation   2. Generalized weakness   3. Elevated LFTs   4. Hx Fatty liver vs alcoholic liver disease   5. Alcohol use   6. Mood disorder   7. Hypothyroidism     History of Present Illness   Naina Zamora is a 75 year old female who presented to the ED with shortness of breath. Her symptoms started yesterday, but has been progressing throughout the night and day today. She also also felt more tired and weak than normal. She has been using her albuterol inhaler, but reports that this has not helped much. She does have a hx of COPD, but denies hx of exacerbations. She has been outside much more this past week than she has in over a year, so thinks that there may be some sort of environmental allergen that is contributing. She has been coughing frequently with sputum production.      In the ED she was hemodynamically stable and satting in the low 90s on room air. Her CXR was normal. Laboratory evaluation was also fairly unremarkable. She was treated with duonebs, and IV solumedrol for possible COPD exacerbating. She reports feeling better after these interventions but still weak and short of breath.   Admitted to observation for further care.    Hospital Course   Shortness of breath and mild acute hypoxemic respiratory failure due to COPD exacerbation   Generalized weakness   Pt presented to the ED with shortness of breath and weakness. Pt has hx of COPD but no prior hospitalizations for exacerbation. She started having some mild a few weeks ago and was prescribed albuterol. Despite the albuterol, her symptoms progressed.  She presents quite short of breath the last two days with  cough and increased sputum production. O2 sats are in the low 90s on room air. Laboratory evaluation is unremarkable with normal trop and BNP. CXR shows no acute abnormality. Pt is a former smoker and currently smokes marijuana.  Later, patient's O2 saturation dropped to the upper 80s, and she was placed on 2 L of oxygen.  --Continue Duonebs scheduled and albuterol inhaler Q2H PRN   --Continue Prednisone 40mg daily started, with plan to taper at discharge  --Azithromycin x5 days  --Respiratory therapy consult   --Smoking cessation  --PT consult, no skilled needs  --Patient has been weaned off of oxygen, able to maintain O2 saturation above 88% with ambulation.  She was offered another evening of monitoring, but she states that her symptoms have much improved and she prefers to discharge home this afternoon.  --Nebulizer unit was given to the patient for her to use at home, along with prescription for DuoNebs, azithromycin, and prednisone taper at discharge.  --Recommend close PCP follow-up within 1 week.  Consider pulmonology referral.    Elevated LFTs   Hx Fatty liver vs alcoholic liver disease   LFTs slightly elevated with ALT of 124 and AST of 75.   --Monitor outpatient      Alcohol use   --No evidence for withdrawal while hospitalized.     Mood disorder   --Resume PTA BuSpar.     Hypothyroidism   --Resume PTA levothyroxine.      Sean Chao PA-C    Significant Results and Procedures   As documented above    Pending Results   None    Code Status   Full Code       Primary Care Physician   Tia Barriga    Physical Exam   Temp: 98.1  F (36.7  C) Temp src: Oral BP: 130/60 Pulse: 86(Simultaneous filing. User may not have seen previous data.)   Resp: 22(Simultaneous filing. User may not have seen previous data.) SpO2: 93 %(Simultaneous filing. User may not have seen previous data.) O2 Device: None (Room air)(Simultaneous filing. User may not have seen previous data.) Oxygen Delivery: 1 LPM  Vitals:     06/20/21 1310   Weight: 72.6 kg (160 lb)     Vital Signs with Ranges  Temp:  [96.1  F (35.6  C)-98.1  F (36.7  C)] 98.1  F (36.7  C)  Pulse:  [51-88] 86  Resp:  [16-22] 22  BP: (121-175)/(41-89) 130/60  SpO2:  [88 %-96 %] 93 %  I/O last 3 completed shifts:  In: 360 [P.O.:360]  Out: -      Constitutional: Alert, oriented to person, place, date, situation.  Cooperative, sitting up in the chair and NAD.  Respiratory:  Lungs CTAB, other than very faint scattered wheezing, no labored breathing, speaking in full sentences.  Cardiovascular:  Heart RRR, no MRG, no edema.  GI:  Abdomen soft, NT/ND and with normoactive BS  Skin/Integumen:  Warm, dry, non-diaphoretic.  MSK: CMS x4 grossly intact.    Discharge Disposition   Discharged to home  Condition at discharge: Stable    Consultations This Hospital Stay   RESPIRATORY CARE IP CONSULT  PHYSICAL THERAPY ADULT IP CONSULT      Discharge Orders      NEBULIZER     Reason for your hospital stay    COPD exacerbation     Follow-up and recommended labs and tests     Follow up with primary care provider, Tia Barriga, within 7 days for hospital follow- up.  Consider pulmonology referral.     Activity    Your activity upon discharge: activity as tolerated     Diet    Follow this diet upon discharge: Regular     Discharge Medications   Current Discharge Medication List      START taking these medications    Details   azithromycin (ZITHROMAX) 250 MG tablet Take 1 tablet (250 mg) by mouth daily  Qty: 3 tablet, Refills: 0    Associated Diagnoses: COPD exacerbation (H)      ipratropium - albuterol 0.5 mg/2.5 mg/3 mL (DUONEB) 0.5-2.5 (3) MG/3ML neb solution Take 1 vial (3 mLs) by nebulization every 6 hours as needed for shortness of breath / dyspnea or wheezing  Qty: 100 mL, Refills: 0    Associated Diagnoses: COPD exacerbation (H)      predniSONE (DELTASONE) 10 MG tablet 4 tabs daily for 2 days, then 3 tabs daily for 2 days, then 2 tabs daily for 2 days, then 1 tab daily for 2 days, then  stop  Qty: 20 tablet, Refills: 0    Associated Diagnoses: COPD exacerbation (H)         CONTINUE these medications which have NOT CHANGED    Details   albuterol (PROAIR HFA/PROVENTIL HFA/VENTOLIN HFA) 108 (90 Base) MCG/ACT inhaler Inhale 1-2 puffs into the lungs every 4 hours as needed for shortness of breath / dyspnea    Comments: Pharmacy may dispense brand covered by insurance (Proair, or proventil or ventolin or generic albuterol inhaler)      busPIRone (BUSPAR) 10 MG tablet Take 10 mg by mouth 2 times daily      calcium carbonate (OS-MAMIE) 1500 (600 Ca) MG tablet Take 600 mg by mouth daily      dorzolamide-timolol (COSOPT) 2-0.5 % ophthalmic solution Place 1 drop into both eyes 2 times daily       latanoprost (XALATAN) 0.005 % ophthalmic solution Place 1 drop into both eyes At Bedtime.      levothyroxine (SSYNTHROID,LEVOTHROID) 100 MCG tablet Take 1 tablet by mouth daily.  Qty: 90 tablet, Refills: 3    Associated Diagnoses: Hypothyroidism      magnesium oxide (MAG-OX) 400 MG tablet Take 400 mg by mouth daily      vitamin D3 (CHOLECALCIFEROL) 50 mcg (2000 units) tablet Take 1 tablet by mouth At Bedtime           Allergies   Allergies   Allergen Reactions     Latex Rash     Data   Most Recent 3 CBC's:  Recent Labs   Lab Test 06/20/21  1342   WBC 6.2   HGB 14.7   MCV 97         Most Recent 3 BMP's:  Recent Labs   Lab Test 06/20/21  1342      POTASSIUM 4.4   CHLORIDE 105   CO2 26   BUN 12   CR 0.78   ANIONGAP 6   MAMIE 9.1   *     Most Recent 2 LFT's:  Recent Labs   Lab Test 06/20/21  1342   AST 75*   *   ALKPHOS 54   BILITOTAL 0.4     Most Recent INR's and Anticoagulation Dosing History:  Anticoagulation Dose History     There is no flowsheet data to display.        Most Recent 3 Troponin's:  Recent Labs   Lab Test 06/20/21  1342   TROPI <0.015     Most Recent Cholesterol Panel:No lab results found.  Most Recent 6 Bacteria Isolates From Any Culture (See EPIC Reports for Culture Details):No  lab results found.  Most Recent TSH, T4 and A1c Labs:No lab results found.  Results for orders placed or performed during the hospital encounter of 06/20/21   XR Chest 2 Views    Narrative    CHEST TWO VIEWS 6/20/2021 2:11 PM     HISTORY: Shortness of breath.    COMPARISON: None.    FINDINGS: Heart size and pulmonary vascularity are within normal  limits. The lungs are clear. No pneumothorax or pleural effusion.       Impression    IMPRESSION: No radiographic evidence of acute chest abnormality.     ESTELA MOTA MD

## 2021-06-21 NOTE — PLAN OF CARE
AVSS; pt on 2L/NC with O2 sats 94-95%; lung sounds diminished; no cough noted until pt up to void; then pt with a frequent, productive cough and expiratory wheezes noted; pt given scheduled neb with relief; pt denied pain; up with SBA; voiding; PT and Respiratory Therapy consults ordered; pt appeared to sleep well overnight.

## 2021-06-21 NOTE — PLAN OF CARE
"A&Ox4. VSS on RA. Pt reports nebs helping greatly, reports dry mouth and \"shaky\" feeling after nebs, side-effect education provided. Faint scattered wheezing, denies SOB. Neida reg diet. Denies nausea. Up Ind. Ambulated in hallway, tolerated well (see progress note).  Ambulating in room ind. Providing self-cares (wash face, brush teeth, etc) ind. Reviewed discharge instructions and medications w/ pt and daughters. Pt able to teach information back to RN. RT provided nebulizer education, pt able to teach back info to RN. Home medications returned to pt. Pt discharging w/ dtr and daughter-in-law who will be staying w/ pt tonight.   "

## 2021-06-29 ENCOUNTER — HOSPITAL ENCOUNTER (EMERGENCY)
Facility: CLINIC | Age: 76
Discharge: HOME | End: 2021-06-29
Attending: EMERGENCY MEDICINE | Admitting: EMERGENCY MEDICINE
Payer: COMMERCIAL

## 2021-06-29 ENCOUNTER — APPOINTMENT (OUTPATIENT)
Dept: CT IMAGING | Facility: CLINIC | Age: 76
End: 2021-06-29
Attending: EMERGENCY MEDICINE
Payer: COMMERCIAL

## 2021-06-29 VITALS
HEART RATE: 72 BPM | OXYGEN SATURATION: 97 % | TEMPERATURE: 97.9 F | RESPIRATION RATE: 16 BRPM | DIASTOLIC BLOOD PRESSURE: 84 MMHG | SYSTOLIC BLOOD PRESSURE: 171 MMHG

## 2021-06-29 DIAGNOSIS — J44.9 CHRONIC OBSTRUCTIVE PULMONARY DISEASE, UNSPECIFIED COPD TYPE (H): ICD-10-CM

## 2021-06-29 DIAGNOSIS — R91.8 PULMONARY NODULES: ICD-10-CM

## 2021-06-29 LAB
ALBUMIN SERPL-MCNC: 3.4 G/DL (ref 3.4–5)
ALP SERPL-CCNC: 55 U/L (ref 40–150)
ALT SERPL W P-5'-P-CCNC: 55 U/L (ref 0–50)
ANION GAP SERPL CALCULATED.3IONS-SCNC: 4 MMOL/L (ref 3–14)
AST SERPL W P-5'-P-CCNC: 17 U/L (ref 0–45)
BASOPHILS # BLD AUTO: 0.1 10E9/L (ref 0–0.2)
BASOPHILS NFR BLD AUTO: 1.1 %
BILIRUB SERPL-MCNC: 0.5 MG/DL (ref 0.2–1.3)
BUN SERPL-MCNC: 13 MG/DL (ref 7–30)
CALCIUM SERPL-MCNC: 9.2 MG/DL (ref 8.5–10.1)
CHLORIDE SERPL-SCNC: 104 MMOL/L (ref 94–109)
CO2 BLDCOV-SCNC: 28 MMOL/L (ref 21–28)
CO2 SERPL-SCNC: 29 MMOL/L (ref 20–32)
CREAT SERPL-MCNC: 0.8 MG/DL (ref 0.52–1.04)
D DIMER PPP FEU-MCNC: 0.9 UG/ML FEU (ref 0–0.5)
DIFFERENTIAL METHOD BLD: ABNORMAL
EOSINOPHIL # BLD AUTO: 1.4 10E9/L (ref 0–0.7)
EOSINOPHIL NFR BLD AUTO: 13.1 %
ERYTHROCYTE [DISTWIDTH] IN BLOOD BY AUTOMATED COUNT: 12 % (ref 10–15)
GFR SERPL CREATININE-BSD FRML MDRD: 73 ML/MIN/{1.73_M2}
GLUCOSE SERPL-MCNC: 90 MG/DL (ref 70–99)
HCT VFR BLD AUTO: 44.5 % (ref 35–47)
HGB BLD-MCNC: 14.6 G/DL (ref 11.7–15.7)
IMM GRANULOCYTES # BLD: 0.1 10E9/L (ref 0–0.4)
IMM GRANULOCYTES NFR BLD: 0.6 %
INTERPRETATION ECG - MUSE: NORMAL
LABORATORY COMMENT REPORT: NORMAL
LACTATE BLD-SCNC: 1.2 MMOL/L (ref 0.7–2.1)
LYMPHOCYTES # BLD AUTO: 3.3 10E9/L (ref 0.8–5.3)
LYMPHOCYTES NFR BLD AUTO: 30.4 %
MCH RBC QN AUTO: 31.9 PG (ref 26.5–33)
MCHC RBC AUTO-ENTMCNC: 32.8 G/DL (ref 31.5–36.5)
MCV RBC AUTO: 97 FL (ref 78–100)
MONOCYTES # BLD AUTO: 0.8 10E9/L (ref 0–1.3)
MONOCYTES NFR BLD AUTO: 7.1 %
NEUTROPHILS # BLD AUTO: 5.2 10E9/L (ref 1.6–8.3)
NEUTROPHILS NFR BLD AUTO: 47.7 %
NRBC # BLD AUTO: 0 10*3/UL
NRBC BLD AUTO-RTO: 0 /100
NT-PROBNP SERPL-MCNC: 42 PG/ML (ref 0–900)
PCO2 BLDV: 48 MM HG (ref 40–50)
PH BLDV: 7.38 PH (ref 7.32–7.43)
PLATELET # BLD AUTO: 216 10E9/L (ref 150–450)
PO2 BLDV: 26 MM HG (ref 25–47)
POTASSIUM SERPL-SCNC: 4.1 MMOL/L (ref 3.4–5.3)
PROT SERPL-MCNC: 7.9 G/DL (ref 6.8–8.8)
RBC # BLD AUTO: 4.57 10E12/L (ref 3.8–5.2)
SAO2 % BLDV FROM PO2: 44 %
SARS-COV-2 RNA RESP QL NAA+PROBE: NEGATIVE
SODIUM SERPL-SCNC: 137 MMOL/L (ref 133–144)
SPECIMEN SOURCE: NORMAL
TROPONIN I SERPL-MCNC: <0.015 UG/L (ref 0–0.04)
WBC # BLD AUTO: 10.9 10E9/L (ref 4–11)

## 2021-06-29 PROCEDURE — 80053 COMPREHEN METABOLIC PANEL: CPT | Performed by: EMERGENCY MEDICINE

## 2021-06-29 PROCEDURE — 94640 AIRWAY INHALATION TREATMENT: CPT

## 2021-06-29 PROCEDURE — 99285 EMERGENCY DEPT VISIT HI MDM: CPT | Mod: 25

## 2021-06-29 PROCEDURE — 84484 ASSAY OF TROPONIN QUANT: CPT | Performed by: EMERGENCY MEDICINE

## 2021-06-29 PROCEDURE — 83880 ASSAY OF NATRIURETIC PEPTIDE: CPT | Performed by: EMERGENCY MEDICINE

## 2021-06-29 PROCEDURE — 96374 THER/PROPH/DIAG INJ IV PUSH: CPT | Mod: 59

## 2021-06-29 PROCEDURE — 82803 BLOOD GASES ANY COMBINATION: CPT

## 2021-06-29 PROCEDURE — 71275 CT ANGIOGRAPHY CHEST: CPT

## 2021-06-29 PROCEDURE — C9803 HOPD COVID-19 SPEC COLLECT: HCPCS

## 2021-06-29 PROCEDURE — 250N000011 HC RX IP 250 OP 636: Performed by: EMERGENCY MEDICINE

## 2021-06-29 PROCEDURE — 85379 FIBRIN DEGRADATION QUANT: CPT | Performed by: EMERGENCY MEDICINE

## 2021-06-29 PROCEDURE — 93005 ELECTROCARDIOGRAM TRACING: CPT

## 2021-06-29 PROCEDURE — 83605 ASSAY OF LACTIC ACID: CPT

## 2021-06-29 PROCEDURE — 87635 SARS-COV-2 COVID-19 AMP PRB: CPT | Performed by: EMERGENCY MEDICINE

## 2021-06-29 PROCEDURE — 85025 COMPLETE CBC W/AUTO DIFF WBC: CPT | Performed by: EMERGENCY MEDICINE

## 2021-06-29 PROCEDURE — 250N000009 HC RX 250: Performed by: EMERGENCY MEDICINE

## 2021-06-29 RX ORDER — IOPAMIDOL 755 MG/ML
64 INJECTION, SOLUTION INTRAVASCULAR ONCE
Status: COMPLETED | OUTPATIENT
Start: 2021-06-29 | End: 2021-06-29

## 2021-06-29 RX ORDER — METHYLPREDNISOLONE SODIUM SUCCINATE 125 MG/2ML
125 INJECTION, POWDER, LYOPHILIZED, FOR SOLUTION INTRAMUSCULAR; INTRAVENOUS ONCE
Status: COMPLETED | OUTPATIENT
Start: 2021-06-29 | End: 2021-06-29

## 2021-06-29 RX ORDER — IPRATROPIUM BROMIDE AND ALBUTEROL SULFATE 2.5; .5 MG/3ML; MG/3ML
6 SOLUTION RESPIRATORY (INHALATION) ONCE
Status: COMPLETED | OUTPATIENT
Start: 2021-06-29 | End: 2021-06-29

## 2021-06-29 RX ORDER — PREDNISONE 20 MG/1
TABLET ORAL
Qty: 10 TABLET | Refills: 0 | Status: ON HOLD | OUTPATIENT
Start: 2021-06-30 | End: 2021-07-22

## 2021-06-29 RX ADMIN — IPRATROPIUM BROMIDE AND ALBUTEROL SULFATE 6 ML: .5; 3 SOLUTION RESPIRATORY (INHALATION) at 09:27

## 2021-06-29 RX ADMIN — IOPAMIDOL 64 ML: 755 INJECTION, SOLUTION INTRAVENOUS at 10:30

## 2021-06-29 RX ADMIN — METHYLPREDNISOLONE SODIUM SUCCINATE 125 MG: 125 INJECTION, POWDER, FOR SOLUTION INTRAMUSCULAR; INTRAVENOUS at 09:26

## 2021-06-29 RX ADMIN — SODIUM CHLORIDE 89 ML: 9 INJECTION, SOLUTION INTRAVENOUS at 10:30

## 2021-06-29 ASSESSMENT — ENCOUNTER SYMPTOMS
SHORTNESS OF BREATH: 1
FEVER: 0
DIARRHEA: 0
COUGH: 1
SLEEP DISTURBANCE: 1
VOMITING: 0

## 2021-06-29 NOTE — ED TRIAGE NOTES
Pt presents to the ER with c/o sob x 1 wk, Seen her PMD and started a home nebulizer  but worse today. Hx of COPD

## 2021-06-29 NOTE — ED PROVIDER NOTES
History   Chief Complaint:  Shortness of breath    HPI   Naina Zamora is a 75 year old female, former smoker, with a history of COPD from chronic bronchitis and hyperlipidemia. The patient was recently admitted to the hospital from -2021 for shortness of breath and mild acute hypoxemic respiratory failure due to COPD. She was discharged home with a home nebulizer, DuoNeb, Prednisone, and Azithromycin.     She arrives to the ED today for evaluation of shortness of breath since being discharged from the hospital.  She recently finished the antibiotics and took her last steroid this morning. She was instructed to use the nebulizer 3x/day then move to PRN when she has shown improvement. She took it 3x yesterday and once this morning, which has helped relieve some of the shortness of breath. Also endorses a deep cough and trouble sleeping. Denies fever, vomiting, diarrhea, chest pain, or leg swelling. No personal history of blood clots.    Review of Systems   Constitutional: Negative for fever.   Respiratory: Positive for cough and shortness of breath.    Cardiovascular: Negative for chest pain and leg swelling.   Gastrointestinal: Negative for diarrhea and vomiting.   Psychiatric/Behavioral: Positive for sleep disturbance.   All other systems reviewed and are negative.    Allergies:  Latex  Statins     Medications:  Albuterol  Deltasone  Dorzolamide  Lidex  Xalatan  Synthroid, Levothroid  Zocor  Buspar     Past Medical History:    Eczema  Glaucoma  Hypothyroidism  Fatty liver  JORGE  Hypercholesterolemia  Hyperlipidemia  Alcohol abuse  COPD     Past Surgical History:     section  Cholecystectomy  Dilation and curettage      Family History:    CAD  Diabetes  Hypertension  Alzheimer disease    Social History:  Marital Status:   Smoking status: Former  PCP: Tia Barriga  Presents to the ED with daughter-in-law    Physical Exam     Patient Vitals for the past 24 hrs:   BP Temp Temp src Pulse  Resp SpO2   06/29/21 1153 -- -- -- -- 16 97 %   06/29/21 1015 -- -- -- 72 11 --   06/29/21 1000 -- -- -- -- -- 96 %   06/29/21 0945 -- -- -- -- -- 100 %   06/29/21 0915 -- -- -- -- -- 96 %   06/29/21 0856 (!) 171/84 97.9  F (36.6  C) Oral 77 20 97 %       Physical Exam  VS: Reviewed per above  HENT:  Normal speech  EYES: sclera anicteric  CV: Rate as noted, regular rhythm.   RESP: Effort normal.  Decreased breath sounds bilaterally, right greater than left expiratory wheeze appreciated.  GI: no tenderness/rebound/guarding, not distended.  NEURO: Alert, moving all extremities  MSK: No deformity of the extremities  SKIN: Warm and dry    Emergency Department Course   ECG  ECG taken at 0950, ECG read at 0952  Normal sinus rhythm. Normal ECG.   No significant changes as compared to prior, dated 11/3/17.  Rate 65 bpm. PA interval 136 ms. QRS duration 74 ms. QT/QTc 382/397 ms. P-R-T axes 39 20 36.     Imaging:  Radiology findings were communicated with the patient who voiced understanding of the findings.    CT Chest PE with contrast:  1.  No pulmonary artery embolism or thoracic aortic dissection.   2.  Mild lingular and right middle lobe subsegmental atelectasis   versus scarring. No pneumonic consolidation or pleural effusion.   3.  Left mid lung 7 mm pulmonary nodule. Recommend follow-up per   guidelines below.     Reading per radiology    Laboratory:  Laboratory findings were communicated with the patient who voiced understanding of the findings.    CBC: WBC: 10.9, HGB: 14.6, PLT: 216    CMP: ALT: 55 (H), o/w WNL (Creatinine: 0.80)    ISTAT gases lactate viry POCT: pH Venous 7.38, PCO2 Venous 48, PO2 Venous 26, Bicarbonate Venous 28, O2 Sat Venous 44, Lactic Acid 1.2    Troponin (Collected 0933): <0.015    D-dimer: 0.9 (H)    BNP: 42    Asymptomatic COVID-19 PCR: Negative    Emergency Department Course:    Reviewed:  I reviewed the patient's nursing notes, vitals, past medical records, Care Everywhere.      Assessments:  0900 I first assessed the patient and performed an exam. Discussed plans for care.    1131 I rechecked the patient and updated them on their results. Discussed plans for discharge.    Interventions:  0926: Solu-Medrol 125 mg IV  0927: Duoneb 6 mL nebulization     Disposition:  The patient was discharged to home.     Impression & Plan      Covid-19  Naina Zamora was evaluated during a global COVID-19 pandemic, which necessitated consideration that the patient might be at risk for infection with the SARS-CoV-2 virus that causes COVID-19.   Applicable protocols for evaluation were followed during the patient's care.   COVID-19 was considered as part of the patient's evaluation. The plan for testing is:  a test was obtained during this visit.    Medical Decision Making:   Naina Zamora is a 75 year old female who presents with her daughter-in-law for evaluation of worsening shortness of breath.  Patient was admitted 1 week ago with concern for COPD exacerbation.  Patient symptoms had improved and she was discharged on Z-Santo and prednisone taper as well as DuoNeb's.  Patient feels like her symptoms have slowly worsened again since discharge.  Here patient has hypertension up to 171/84 on arrival.  She is afebrile.  SPO2 is 97% on room air.  She has decreased breath sounds bilaterally and mild right greater than left expiratory wheezing.  I reviewed recent hospitalization.  Plan for repeat laboratory evaluation with addition of D-dimer as well as Solu-Medrol and DuoNeb's due to chief concern for worsening of underlying COPD.    Initial evaluation reveals no significant leukocytosis, no significant hypercarbia on VBG, no lactic acidosis, no troponin elevation, no BNP elevation.  D-dimer was elevated prompting CT pulmonary angiogram that was negative for PE or other acute intrathoracic process.  Incidental lung nodule was relayed to patient with recommendation for follow-up CT in 6 to 12 months  time.  Patient ambulated in the ER without concerning symptoms and felt better after DuoNeb and steroids here in the ER.  Patient would like to discharge home.  Plan for additional prednisone burst and instructions to use nebulizer at home.  Plan for close primary care follow-up.  Return precautions discussed prior to discharge.    Diagnosis:     ICD-10-CM    1. Chronic obstructive pulmonary disease, unspecified COPD type (H)  J44.9    2. Pulmonary nodules  R91.8        Discharge Medications:  Discharge Medication List as of 6/29/2021 11:44 AM      START taking these medications    Details   !! predniSONE (DELTASONE) 20 MG tablet Take two tablets (= 40mg) each day for 5 (five) days, Disp-10 tablet, R-0, E-Prescribe       !! - Potential duplicate medications found. Please discuss with provider.           Scribe Disclosure:  I, Zaira Maurer, am serving as a scribe on 6/29/2021 at 9:00 AM to personally document services performed by Daryl Abreu MD based on my observations and the provider's statements to me.         Daryl Abreu MD  06/29/21 1811

## 2021-06-29 NOTE — ED NOTES
[facility] Report

Study Information

Study Number    Admission           Scheduled Start              Study Start

 

[id number]    Dec 17 2017 2:44PM      12/22/2017                Dec 22 2017 8:48AM

 

Universal Service

 

Cardiac Catheterization

 

Admit Source               Facility Department

 

Other                   [facility] - Cath Lab

 

Physician and Clinical Staff

Initial Gonzalo Duffy          Circulator     Lydia Evans,SHARON

 

                         Other        cathlab, cathlab

 

                         Recorder      Uday Dupree RCIS(BS)

 

                         Scrub        Lloyd, Henny,RRT TECH2

 

Procedures Performed

Procedure                     Location (Site)            Vessel Name

 

Angiogram LV                   LV                   Ventricle

Coronary Angiograms                 LCA                 Left Coronary

Coronary Angiograms                 RCA                 Right Coronary

L Heart Cath

Equipment

Time          Description           Size         Mfg Part Number  Used/Scraped

                  TRANSDUCER, TRUWAVE                  [id number]

08:50   ROMERO GONZALEZ                       *                    Used

                  W/STOCKCOCK                      *[id number]

                                              [phone]



                                              *[id number]

                                              534-552S



                                              *[id number]

                                              [id number]

08:50   [employer]    PACK, CCL CUSTOM         *                    Used

                                              *[id number]

                                              [id number]

08:50   [employer]ER       PEN, SKIN DUAL W/ RULER     *                    Used

                                              *[id number]

                                              [id number]

09:40   [employer]         JL 4.0 DXTERITY CATHETER     FR 5                  Used

                                              *[id number]

                                              [id number]

08:50   [employer]       WIRE, 3MMJ .035 180CM      180CM                  Used

                                              *[id number]

                  PROBE COVER, STERILE                  [id number]

08:50   [employer]                      *                    Used

                  ULTRASOUND W/ GEL                   *[id number]

                                              [id number]

08:50   NAMIC           MANIFOLD, 4 PORT         *                    Used

                                              *[id number]

                                              [id number]

08:50   NAMIC           TUBING, HIGH PRESSURE 48"    48"                   Used

                                              *[id number]

08:50   [employer]          OMNIPAQUE, 350 MG, 150ML     150ML         [id number]      Used

 

10:10   [employer]          OMNIPAQUE, 350 MG, 150ML     150ML         [id number]      Used

                                              [id number]

08:50   [employer]       BLANKET,WARM AIR CCL       *                    Used

                                              *[id number]

                                              [id number]

08:50   [employer]      SHEATH, FR5 TERUMO (10CM)    FR 5                  Used

                                              *[id number]

                                              [id number]

10:07   [employer]      SHEATH, FR5 TERUMO (25CM)    FR 5                  Used

                                              *[id number]

 

History: Risk Factors

                    Family History of

Hypertension   Dyslipidemia               Previous MI   Previous Heart Failure

                    Premature CAD

Yes        Yes         No          No        No

Prior Valve

         Prior PCI      Prior CABG

Surgery

No        No          No

         Cerebrovascular   Peripheral Artery  Chronic Lung

On Dialysis                                 Diabetes

         Disease       Disease       Disease

No        No          No          No        No

History: Stress Tests

Stress or Imaging Studies Performed

 

Yes

Standard Exercise Stress

Test

No

 

Stress Echo

 

No

 

Stress Test SPECT      Stress Test SPECT Result    Stress Test SPECT Ischemia Risk/Extent

 

Yes             Positive            High

 

Stress Test CMR

 

No

 

Cardiac CTA         Coronary Calcium Score

 

No             No

 

 

Labs

Hgb (g/dl)      Hct (%)        WBC (l/cumm)

 

11.60-17.00     35.00-51.00      4.00-11.00

 

19.3         56.4          26.4

 

Glucose (mg/dl)   BUN (mg/dl)      Creatinine (mg/dl)  BUN:Creatinine (1:x)

74..00     7.00-18.00       0.50-1.30       10.00-20.00

 

104         6           0.6          10

 

Na (meq/l)      K (meq/l)

 

136..00    3.50-5.10

 

136         3.6

 

INR (PTT:PT)

 

0.90-1.10

 

1.5

 

CPK-MB (ng/ML)

 

0.50-3.60

 

Not Drawn

 

 

 

 

Medication

Medication Total Dose (Bolus/Oral)

Medication            Total Dosage/Unit

 

1% XYLOCAINE              20 mL

 

FENTANYL                100 mcg

 

VERSED                 2 mg

Medications (Bolus/Oral)

Medication          Time Given           Dosage/Unit       Administered By      Reason

 

VERSED            12/22/2017 9:45:02 AM      0.5 mg         Hesher, Lydia

0.5 mg VERSED given in lab by Lydia Evans RN in Left Wrist via Peripheral IV. Ordered by Gonzalo Gerard.

 

FENTANYL           12/22/2017 9:46:02 AM      25 mcg         Hesher, Lydia

25 mcg FENTANYL given in lab by Lydia Evans RN in Left Wrist via Peripheral IV. Ordered by Gonzalo Israel.

 

VERSED            12/22/2017 9:57:20 AM      0.5 mg         Hesher, Lydia

0.5 mg VERSED given in lab by Lydia Evans RN in Left Wrist via Peripheral IV. Ordered by Gonzalo Gerard.

 

FENTANYL           12/22/2017 9:58:02 AM      25 mcg         Hesher, Lydia

25 mcg FENTANYL given in lab by Lydia Evans RN in Left Wrist via Peripheral IV. Ordered by Quadra
t, [place]ar.

 

               12/22/2017 10:01:02

VERSED                            0.5 mg         Hesher, Lydia

               AM

0.5 mg VERSED given in lab by Lydia Evans RN in Left Wrist via Peripheral IV. Ordered by Gonzalo Gerard.

 

               12/22/2017 10:02:02

FENTANYL                           25 mcg         Nick Evanson

               AM

25 mcg FENTANYL given in lab by Lydia Evans RN in Left Wrist via Peripheral IV. Ordered by Quadra
t, [place].

 

               12/22/2017 10:04:53

1% XYLOCAINE                         20 mL         Gonzalo Gerard AM

20 mL 1% XYLOCAINE given in lab by Gonzalo Gerard in Left Groin via Subcutaneous. Ordered by Gonzalo Gerard.

 

               12/22/2017 10:09:00

VERSED                            0.5 mg         Lydia Evans

               AM

0.5 mg VERSED given in lab by Lydia Evans RN in Left Wrist via Peripheral IV. Ordered by Gonzalo Gerard.

 

               12/22/2017 10:16:03

FENTANYL                           25 mcg         Lydia Evans

               AM

25 mcg FENTANYL given in lab by Lydia Evans RN in Left Wrist via Peripheral IV. Ordered by Quadra
t, [place].

 

Medication (Drip)

Medication          Time Given           Dosage/Unit       Concentration/Unit  Diluent (ml)  Solution


 

IV Solutions         12/22/2017 9:29:54 AM       0 mL (IV)                 500       NaCl .9

Patient arrived on IV Solutions given by zeeshan byers in Left Wrist via Peripheral IV. Pump/Drip 
Flow = 20 ml/hr using NaCl .9. Ordered by Gonzalo Gerard.

Initial Case Assessment

Cardiovascular

HR          Rhythm         NIBP          Chest Pain

 

93          nsr           157/97         0

 

Edema Present     Skin color           Skin

 

None         Normal             Warm Dry

 

Circulatory - Right Pulses

Dorsalis Pedis    Femoral

 

d           2

 

Scale (0,1,2,3,4,d)

 

Circulatory - Left Pulses

Dorsalis Pedis    Femoral

 

2           2

 

Scale (0,1,2,3,4,d)

 

Neurological State

           Oriented to time-place-

Alert                     Moves all extremities

           person

 

Respiration - General

Respiration Rate

           SpO2 (%)        O2 (lpm)

(B/min)

15          95           2

Final Case Assessment

Cardiovascular

HR           Rhythm          NIBP          Chest Pain

 

92           nsr            163/93         0

 

Edema Present      Skin color            Skin

 

None          Normal              Warm Dry

 

Circulatory - Right Pulses

Dorsalis Pedis     Femoral

 

d            2

 

Scale (0,1,2,3,4,d)

 

Circulatory - Left Pulses

Dorsalis Pedis     Femoral

 

0            2

 

Scale (0,1,2,3,4,d)

 

Neurological State

            Oriented to time-place-

Alert                      Moves all extremities

            person

 

Respiration - General

Respiration Rate

            SpO2 (%)         O2 (lpm)

(B/min)

15           95            2

 

Chronological Log

Time    Study Chronological Log

 

9:29:35   Patient arrived via Bed. Heparin drip DCed per MD upon 

 

9:29:36   Patient Name, D.O.B, / Armband Verified By R.N.

 

9:29:36   Consent signed by the physician and the patient and verified by the Cath Lab staff.

 

9:29:37   Pre-op and post- op instructions given; patient acknowledges understanding of instructions.


 

9:29:37   Verbal Stimulation=2 Physical Stimulation=2 Airway=2 Respiration=2 TOTAL=8. (0=absent, 1=li
mited, 2=present)

 

9:29:39   Presedation assessment performed by Cath Lab RN.

 

9:29:39   Immediate Presedation assesment performed by physician.

 

9:29:40   Patient has been NPO for More than 6Hrs.

 

9:29:41   Skin Breakdown- none per patient

 

9:29:42   Disposable Defibrillator Pads Placed On Patient.

 

9:29:43   Clifford Prominences Protected

 

9:29:53   A # 20 IV was noted in the Wrist (left). Grade = 0

      Patient arrived on IV Solutions given by cathlabzeeshan in Left Wrist via Peripheral IV. Pump
/Drip Flow = 20 ml/hr

9:29:54

      using NaCl .9. Ordered by Gonzalo Gerard.

9:29:55   History and physical on the chart or being dictated.

 

9:36:25   Reference ECG taken

      Vitals capture started with the following parameters, Patient=Adult, Interval=5 min, Initial Pr
dbdpjt=617 mmHg,

9:36:26

      Deflation Rate=5 mmHg, Cuff placed on Left Arm

      Assessment: Initial Case, HR=93 BPM, Rhythm=nsr, VXPE=387/97 mmhg, Chest Pain=0, Edema=None, Co
jacinto=Normal,

      Skin = Warm, Dry

      Right Pulses: Srikanth Ped=d, Femoral=2

9:36:27

      Left Pulses: Srikanth Ped=2, Femoral=2

      Neurological: State=Alert, Ox3, PRESCOTT

      Respiration: Resp=15 B/min, SpO2=95 %, O2=2 lpm

9:37:27  HR=88 bpm, SBBH=034/97 mmhg, SpO2=95.0 %, Resp=14 B/min, Pain=0, Anca=10, Shirley=2

 

9:40:43  Left groin prepped with 2% chlorhexidine, and draped after a 3 min. waiting time.

 

9:42:01  HR=84 bpm, HAZZ=953/90 mmhg, SpO2=97.0 %, Resp=18 B/min, Pain=0, Anca=10, Shirley=2

 

9:45:01  MD paged

 

9:45:02  0.5 mg VERSED given in lab by Lydia Evans, RN in Left Wrist via Peripheral IV. Ordered by
 Gonzalo Gerard.

 

9:46:02  25 mcg FENTANYL given in lab by Lydia Evans RN in Left Wrist via Peripheral IV. Ordered 
by Gonzalo Gerard.

 

9:47:02  HR=84 bpm, VQRV=679/91 mmhg, SpO2=98.0 %, Resp=16 B/min, Pain=0, Anca=10, Shirley=2

 

9:47:10  Pressure channel 1 zeroed.

 

9:52:01  HR=82 bpm, XDLK=389/92 mmhg, SpO2=98.0 %, Resp=14 B/min, Pain=0, Anca=10, Shirley=2

 

9:56:40  MD arrived.

 

9:56:43  Contrast Scanned

 

9:56:43  Immediate Presedation assesment performed by physician.

 

9:57:02  HR=83 bpm, XWMS=431/91 mmhg, SpO2=97.0 %, Resp=16 B/min, Pain=0, Anca=10, Shirley=2

 

9:57:20  0.5 mg VERSED given in lab by Lydia Evans, RN in Left Wrist via Peripheral IV. Ordered by
 Gonzalo Gerard.

 

9:58:02  25 mcg FENTANYL given in lab by Lydia Evans RN in Left Wrist via Peripheral IV. Ordered 
by Gonzalo Gerard.

 

10:01:02  0.5 mg VERSED given in lab by Lydia Evans, RN in Left Wrist via Peripheral IV. Ordered b
Gonzalo Reyes.

 

10:02:01  HR=83 bpm, HGZP=127/87 mmhg, SpO2=96.0 %, Resp=16 B/min, Pain=0, Anca=10, Shirley=2

 

10:02:02  25 mcg FENTANYL given in lab by Lydia Evans, RN in Left Wrist via Peripheral IV. Ordered
 by Gonzalo Gerard.

      Time Out. Correct patient, correct procedure, correct physician, power injector loaded with con
trast with surgical team

10:02:52

      present. Time Out Concurred by MD and individual staff in procedure.

10:03:02  Case Start

 

10:04:53  20 mL 1% XYLOCAINE given in lab by Gonzalo Gerard in Left Groin via Subcutaneous. Ordered 
by Gonzalo Gerard.

 

10:04:59  Access site was Left Femoral Artery.

 

10:05:03  A SHEATH, FR5 TERUMO (25CM) FR 5 was advanced into the Fem Art (left) using the Percutaneou
s technique.

 

10:05:55  Activated Clotting Time Drawn

      A PIGTAIL ANG. INFINITI CATHETER FR 5 was advanced over a wire. OMNIPAQUE, 350 MG, 150ML 150ML 
was used

10:06:54

      for injections.

10:07:00  HR=89 bpm, KSKH=063/88 mmhg, SpO2=96.0 %, Resp=15 B/min, Pain=0, Anca=10, Shirley=2

 

10:09:00  0.5 mg VERSED given in lab by Lydia Evans, RN in Left Wrist via Peripheral IV. Ordered b
y Gonzalo Gerard.

      Recorded Pressure: LV, HR=83, Condition=Condition 1

10:09:51

      (Left Ventricle) /13/20

10:10:00  The LV was injected at 10 cc/sec for a total of 30. OMNIPAQUE, 350 MG, 150ML 150ML used.

 

10:10:10  ACT (Normal Range ) = 134

 

10:12:02  HR=90 bpm, IYHT=648/89 mmhg, SpO2=96.0 %, Resp=14 B/min, Pain=0, Anca=10, Shirley=2

      Recorded Pressure: LV, Ao, HR=90, Condition=Condition 1

10:12:03  (Left Ventricle) /18/21,

      (Aorta) Ao 153/77/111

      After removing the current catheter a JL 4.0 DXTERITY CATHETER FR 5 was advanced over a WIRE, 3
MMJ .035 180CM

10:12:54

      180CM.

      Recorded Pressure: Ao, HR=90, Condition=Condition 1

10:13:39

      (Aorta) Ao 154/77/111

10:13:49  The  LCA was injected and visualized at various angles. OMNIPAQUE, 350 MG, 150ML 150ML used
.

 

10:16:03  25 mcg FENTANYL given in lab by Lydia Evans, RN in Left Wrist via Peripheral IV. Ordered
 by Gonzalo Gerard.

      After removing the current catheter a AR MOD INFINITI CATHETER FR 5 was advanced over a WIRE, 3
MMJ .035 180CM

10:16:30

      180CM.

10:17:01  HR=92 bpm, NIBP=160/92 mmhg, SpO2=97.0 %, Resp=13 B/min, Pain=0, Anca=10, Shirley=2

 

10:17:01  The  RCA was injected and visualized at various angles. OMNIPAQUE, 350 MG, 150ML 150ML used
.

 

10:18:18  Catheter was removed

      A SHEATH, FR5 TERUMO (10CM) FR 5 was exchanged in the Fem Art (right). This was necessary in or
dolores to achieve

10:19:24

      vascular hemostasis.

10:21:30  An injection in the Fem Art (left) was made through the SHEATH, FR5 TERUMO (10CM) FR 5.

 

10:22:00  Case End

 

10:22:02  HR=95 bpm, MGAM=414/93 mmhg, SpO2=97.0 %, Resp=12 B/min, Pain=0, Anca=10, Shirley=2

      Assessment: Final Case, HR=92 BPM, Rhythm=nsr, UIGV=097/93 mmhg, Chest Pain=0, Edema=None, [place]
r=Normal,

      Skin = Warm, Dry

      Right Pulses: Srikanth Ped=d, Femoral=2

10:25:07

      Left Pulses: Srikanth Ped=0, Femoral=2

      Neurological: State=Alert, Ox3, PRESCOTT

      Respiration: Resp=15 B/min, SpO2=95 %, O2=2 lpm

10:25:18  Catheter(s) removed without difficulty

 

10:25:19  Sheath removed; pressure applied to access site.

 

10:25:21  No case complications noted.

 

10:25:22  Cine recording checked.

 

10:25:25  Bedside Report will be given.

 

10:25:25  Contrast Scanned

 

10:25:32  Verbal Stimulation=2 Physical Stimulation=2 Airway=2 Respiration=2 TOTAL=8. (0=absent, 1=li
mited, 2=present)

 

10:25:41  A Left Heart Cath was performed.

 

10:27:05  HR=90 bpm, SANR=709/92 mmhg, SpO2=96.0 %, Resp=15 B/min, Pain=0, Anca=10, Shirley=2

 

10:32:06  HR=83 bpm, RSAH=866/84 mmhg, SpO2=97.0 %, Resp=13 B/min, Pain=0, Anca=10, Shirley=2

 

10:37:05  HR=92 bpm, QRHY=452/90 mmhg, SpO2=97.0 %, Resp=13 B/min, Pain=0, Anca=10, Shirley=2

 

10:41:27  Sterile dressing applied to site

 

10:41:29  Patient moved to stretcher

 

10:41:33  Vitals capture stopped.

 

 

 

 

End Study - Contrast Media Used In Study

Contrast       Total Opened (mL)     Total Used (mL)     Total Wasted (mL)

 

Omnipaque      60            60           0

 

End Study - Maximum Contrast Load

Max Contrast Load (mL)

 

443.2

End Study - Radiation Exposure

Fluoro Time

(minutes)

1.5

 

 

End Study - Patient Disposition

Complications  Transferred To   Interventional Outcome

 

No       Cath Lab Holding  No attempt made Patient ambulated to the bathroom.

## 2021-07-19 ENCOUNTER — HOSPITAL ENCOUNTER (INPATIENT)
Facility: CLINIC | Age: 76
LOS: 2 days | Discharge: HOME | DRG: 190 | End: 2021-07-22
Attending: EMERGENCY MEDICINE | Admitting: HOSPITALIST
Payer: COMMERCIAL

## 2021-07-19 ENCOUNTER — APPOINTMENT (OUTPATIENT)
Dept: GENERAL RADIOLOGY | Facility: CLINIC | Age: 76
DRG: 190 | End: 2021-07-19
Attending: EMERGENCY MEDICINE
Payer: COMMERCIAL

## 2021-07-19 DIAGNOSIS — R09.02 HYPOXEMIA: ICD-10-CM

## 2021-07-19 DIAGNOSIS — J44.1 COPD EXACERBATION (H): ICD-10-CM

## 2021-07-19 LAB
ANION GAP SERPL CALCULATED.3IONS-SCNC: 4 MMOL/L (ref 3–14)
ATRIAL RATE - MUSE: 88 BPM
BASOPHILS # BLD AUTO: 0 10E3/UL (ref 0–0.2)
BASOPHILS NFR BLD AUTO: 1 %
BUN SERPL-MCNC: 9 MG/DL (ref 7–30)
CALCIUM SERPL-MCNC: 9.5 MG/DL (ref 8.5–10.1)
CHLORIDE BLD-SCNC: 102 MMOL/L (ref 94–109)
CO2 SERPL-SCNC: 30 MMOL/L (ref 20–32)
CREAT SERPL-MCNC: 0.79 MG/DL (ref 0.52–1.04)
DIASTOLIC BLOOD PRESSURE - MUSE: NORMAL MMHG
EOSINOPHIL # BLD AUTO: 0.9 10E3/UL (ref 0–0.7)
EOSINOPHIL NFR BLD AUTO: 13 %
ERYTHROCYTE [DISTWIDTH] IN BLOOD BY AUTOMATED COUNT: 11.7 % (ref 10–15)
GFR SERPL CREATININE-BSD FRML MDRD: 74 ML/MIN/1.73M2
GLUCOSE BLD-MCNC: 111 MG/DL (ref 70–99)
HCO3 BLDV-SCNC: 28 MMOL/L (ref 21–28)
HCT VFR BLD AUTO: 45.4 % (ref 35–47)
HGB BLD-MCNC: 15.3 G/DL (ref 11.7–15.7)
HOLD SPECIMEN: NORMAL
HOLD SPECIMEN: NORMAL
IMM GRANULOCYTES # BLD: 0 10E3/UL
IMM GRANULOCYTES NFR BLD: 0 %
INTERPRETATION ECG - MUSE: NORMAL
LACTATE BLD-SCNC: 0.8 MMOL/L
LACTATE SERPL-SCNC: 1.4 MMOL/L (ref 0.7–2)
LYMPHOCYTES # BLD AUTO: 1.2 10E3/UL (ref 0.8–5.3)
LYMPHOCYTES NFR BLD AUTO: 17 %
MCH RBC QN AUTO: 32.6 PG (ref 26.5–33)
MCHC RBC AUTO-ENTMCNC: 33.7 G/DL (ref 31.5–36.5)
MCV RBC AUTO: 97 FL (ref 78–100)
MONOCYTES # BLD AUTO: 0.5 10E3/UL (ref 0–1.3)
MONOCYTES NFR BLD AUTO: 6 %
NEUTROPHILS # BLD AUTO: 4.5 10E3/UL (ref 1.6–8.3)
NEUTROPHILS NFR BLD AUTO: 63 %
NRBC # BLD AUTO: 0 10E3/UL
NRBC BLD AUTO-RTO: 0 /100
NT-PROBNP SERPL-MCNC: 120 PG/ML (ref 0–900)
P AXIS - MUSE: 74 DEGREES
PCO2 BLDV: 48 MM HG (ref 40–50)
PH BLDV: 7.37 [PH] (ref 7.32–7.43)
PLATELET # BLD AUTO: 243 10E3/UL (ref 150–450)
PO2 BLDV: 35 MM HG (ref 25–47)
POTASSIUM BLD-SCNC: 4.3 MMOL/L (ref 3.4–5.3)
PR INTERVAL - MUSE: 160 MS
QRS DURATION - MUSE: 76 MS
QT - MUSE: 340 MS
QTC - MUSE: 411 MS
R AXIS - MUSE: 64 DEGREES
RBC # BLD AUTO: 4.7 10E6/UL (ref 3.8–5.2)
SAO2 % BLDV: 65 % (ref 94–100)
SARS-COV-2 RNA RESP QL NAA+PROBE: NEGATIVE
SODIUM SERPL-SCNC: 136 MMOL/L (ref 133–144)
SYSTOLIC BLOOD PRESSURE - MUSE: NORMAL MMHG
T AXIS - MUSE: 65 DEGREES
TROPONIN I SERPL-MCNC: <0.015 UG/L (ref 0–0.04)
VENTRICULAR RATE- MUSE: 88 BPM
WBC # BLD AUTO: 7.2 10E3/UL (ref 4–11)

## 2021-07-19 PROCEDURE — 83880 ASSAY OF NATRIURETIC PEPTIDE: CPT | Performed by: EMERGENCY MEDICINE

## 2021-07-19 PROCEDURE — 250N000009 HC RX 250

## 2021-07-19 PROCEDURE — 84484 ASSAY OF TROPONIN QUANT: CPT | Performed by: PHYSICIAN ASSISTANT

## 2021-07-19 PROCEDURE — 94640 AIRWAY INHALATION TREATMENT: CPT | Mod: 76

## 2021-07-19 PROCEDURE — 71045 X-RAY EXAM CHEST 1 VIEW: CPT

## 2021-07-19 PROCEDURE — 83605 ASSAY OF LACTIC ACID: CPT | Performed by: PHYSICIAN ASSISTANT

## 2021-07-19 PROCEDURE — 250N000009 HC RX 250: Performed by: PHYSICIAN ASSISTANT

## 2021-07-19 PROCEDURE — 85004 AUTOMATED DIFF WBC COUNT: CPT | Performed by: EMERGENCY MEDICINE

## 2021-07-19 PROCEDURE — 250N000013 HC RX MED GY IP 250 OP 250 PS 637: Performed by: EMERGENCY MEDICINE

## 2021-07-19 PROCEDURE — 36592 COLLECT BLOOD FROM PICC: CPT | Performed by: EMERGENCY MEDICINE

## 2021-07-19 PROCEDURE — 36415 COLL VENOUS BLD VENIPUNCTURE: CPT | Performed by: EMERGENCY MEDICINE

## 2021-07-19 PROCEDURE — 250N000013 HC RX MED GY IP 250 OP 250 PS 637: Performed by: PHYSICIAN ASSISTANT

## 2021-07-19 PROCEDURE — 99285 EMERGENCY DEPT VISIT HI MDM: CPT | Mod: 25

## 2021-07-19 PROCEDURE — C9803 HOPD COVID-19 SPEC COLLECT: HCPCS

## 2021-07-19 PROCEDURE — G0378 HOSPITAL OBSERVATION PER HR: HCPCS

## 2021-07-19 PROCEDURE — 250N000012 HC RX MED GY IP 250 OP 636 PS 637: Performed by: PHYSICIAN ASSISTANT

## 2021-07-19 PROCEDURE — 93005 ELECTROCARDIOGRAM TRACING: CPT

## 2021-07-19 PROCEDURE — 36415 COLL VENOUS BLD VENIPUNCTURE: CPT | Performed by: PHYSICIAN ASSISTANT

## 2021-07-19 PROCEDURE — 999N000157 HC STATISTIC RCP TIME EA 10 MIN

## 2021-07-19 PROCEDURE — 87635 SARS-COV-2 COVID-19 AMP PRB: CPT | Performed by: EMERGENCY MEDICINE

## 2021-07-19 PROCEDURE — 250N000011 HC RX IP 250 OP 636

## 2021-07-19 PROCEDURE — 99220 PR INITIAL OBSERVATION CARE,LEVEL III: CPT | Mod: AI | Performed by: HOSPITALIST

## 2021-07-19 PROCEDURE — 96375 TX/PRO/DX INJ NEW DRUG ADDON: CPT

## 2021-07-19 PROCEDURE — 82803 BLOOD GASES ANY COMBINATION: CPT

## 2021-07-19 PROCEDURE — 80048 BASIC METABOLIC PNL TOTAL CA: CPT | Performed by: EMERGENCY MEDICINE

## 2021-07-19 PROCEDURE — 99207 PR APP CREDIT; MD BILLING SHARED VISIT: CPT | Performed by: PHYSICIAN ASSISTANT

## 2021-07-19 PROCEDURE — 250N000011 HC RX IP 250 OP 636: Performed by: EMERGENCY MEDICINE

## 2021-07-19 PROCEDURE — 96365 THER/PROPH/DIAG IV INF INIT: CPT

## 2021-07-19 PROCEDURE — 94640 AIRWAY INHALATION TREATMENT: CPT

## 2021-07-19 RX ORDER — LEVOTHYROXINE SODIUM 50 UG/1
100 TABLET ORAL DAILY
Status: DISCONTINUED | OUTPATIENT
Start: 2021-07-19 | End: 2021-07-19

## 2021-07-19 RX ORDER — DOXYCYCLINE 100 MG/1
100 CAPSULE ORAL 2 TIMES DAILY
Status: DISCONTINUED | OUTPATIENT
Start: 2021-07-19 | End: 2021-07-22 | Stop reason: HOSPADM

## 2021-07-19 RX ORDER — IPRATROPIUM BROMIDE AND ALBUTEROL SULFATE 2.5; .5 MG/3ML; MG/3ML
SOLUTION RESPIRATORY (INHALATION)
Status: COMPLETED
Start: 2021-07-19 | End: 2021-07-19

## 2021-07-19 RX ORDER — PROCHLORPERAZINE 25 MG
12.5 SUPPOSITORY, RECTAL RECTAL EVERY 12 HOURS PRN
Status: DISCONTINUED | OUTPATIENT
Start: 2021-07-19 | End: 2021-07-22 | Stop reason: HOSPADM

## 2021-07-19 RX ORDER — ACETAMINOPHEN 325 MG/1
650 TABLET ORAL ONCE
Status: COMPLETED | OUTPATIENT
Start: 2021-07-19 | End: 2021-07-19

## 2021-07-19 RX ORDER — ONDANSETRON 2 MG/ML
INJECTION INTRAMUSCULAR; INTRAVENOUS
Status: COMPLETED
Start: 2021-07-19 | End: 2021-07-19

## 2021-07-19 RX ORDER — DIPHENHYDRAMINE HCL 25 MG
12.5 TABLET ORAL
COMMUNITY

## 2021-07-19 RX ORDER — PROCHLORPERAZINE MALEATE 5 MG
5 TABLET ORAL EVERY 6 HOURS PRN
Status: DISCONTINUED | OUTPATIENT
Start: 2021-07-19 | End: 2021-07-22 | Stop reason: HOSPADM

## 2021-07-19 RX ORDER — METHYLPREDNISOLONE SODIUM SUCCINATE 125 MG/2ML
INJECTION, POWDER, LYOPHILIZED, FOR SOLUTION INTRAMUSCULAR; INTRAVENOUS
Status: COMPLETED
Start: 2021-07-19 | End: 2021-07-19

## 2021-07-19 RX ORDER — ONDANSETRON 4 MG/1
4 TABLET, ORALLY DISINTEGRATING ORAL EVERY 6 HOURS PRN
Status: DISCONTINUED | OUTPATIENT
Start: 2021-07-19 | End: 2021-07-22 | Stop reason: HOSPADM

## 2021-07-19 RX ORDER — PREDNISONE 20 MG/1
60 TABLET ORAL DAILY
Status: DISCONTINUED | OUTPATIENT
Start: 2021-07-20 | End: 2021-07-20

## 2021-07-19 RX ORDER — LEVOFLOXACIN 5 MG/ML
500 INJECTION, SOLUTION INTRAVENOUS ONCE
Status: COMPLETED | OUTPATIENT
Start: 2021-07-19 | End: 2021-07-19

## 2021-07-19 RX ORDER — LANOLIN ALCOHOL/MO/W.PET/CERES
1 CREAM (GRAM) TOPICAL
COMMUNITY

## 2021-07-19 RX ORDER — ACETAMINOPHEN 650 MG/1
650 SUPPOSITORY RECTAL EVERY 6 HOURS PRN
Status: DISCONTINUED | OUTPATIENT
Start: 2021-07-19 | End: 2021-07-22 | Stop reason: HOSPADM

## 2021-07-19 RX ORDER — PREDNISONE 20 MG/1
40 TABLET ORAL DAILY
Status: DISCONTINUED | OUTPATIENT
Start: 2021-07-19 | End: 2021-07-19

## 2021-07-19 RX ORDER — FAMOTIDINE 10 MG
10 TABLET ORAL 2 TIMES DAILY
Status: DISCONTINUED | OUTPATIENT
Start: 2021-07-19 | End: 2021-07-22 | Stop reason: HOSPADM

## 2021-07-19 RX ORDER — ALBUTEROL SULFATE 0.83 MG/ML
2.5 SOLUTION RESPIRATORY (INHALATION)
Status: DISCONTINUED | OUTPATIENT
Start: 2021-07-19 | End: 2021-07-22 | Stop reason: HOSPADM

## 2021-07-19 RX ORDER — IPRATROPIUM BROMIDE AND ALBUTEROL SULFATE 2.5; .5 MG/3ML; MG/3ML
3 SOLUTION RESPIRATORY (INHALATION) ONCE
Status: COMPLETED | OUTPATIENT
Start: 2021-07-19 | End: 2021-07-19

## 2021-07-19 RX ORDER — IPRATROPIUM BROMIDE AND ALBUTEROL SULFATE 2.5; .5 MG/3ML; MG/3ML
3 SOLUTION RESPIRATORY (INHALATION) EVERY 4 HOURS
Status: DISCONTINUED | OUTPATIENT
Start: 2021-07-19 | End: 2021-07-20

## 2021-07-19 RX ORDER — ACETAMINOPHEN 325 MG/1
650 TABLET ORAL EVERY 6 HOURS PRN
Status: DISCONTINUED | OUTPATIENT
Start: 2021-07-19 | End: 2021-07-22 | Stop reason: HOSPADM

## 2021-07-19 RX ORDER — FEXOFENADINE HCL 60 MG/1
60 TABLET, FILM COATED ORAL DAILY
Status: DISCONTINUED | OUTPATIENT
Start: 2021-07-19 | End: 2021-07-22 | Stop reason: HOSPADM

## 2021-07-19 RX ORDER — ONDANSETRON 2 MG/ML
4 INJECTION INTRAMUSCULAR; INTRAVENOUS EVERY 6 HOURS PRN
Status: DISCONTINUED | OUTPATIENT
Start: 2021-07-19 | End: 2021-07-22 | Stop reason: HOSPADM

## 2021-07-19 RX ADMIN — IPRATROPIUM BROMIDE AND ALBUTEROL SULFATE: .5; 3 SOLUTION RESPIRATORY (INHALATION) at 04:57

## 2021-07-19 RX ADMIN — ONDANSETRON 4 MG: 2 INJECTION INTRAMUSCULAR; INTRAVENOUS at 05:25

## 2021-07-19 RX ADMIN — ACETAMINOPHEN 650 MG: 325 TABLET, FILM COATED ORAL at 13:23

## 2021-07-19 RX ADMIN — METHYLPREDNISOLONE SODIUM SUCCINATE 125 MG: 125 INJECTION, POWDER, FOR SOLUTION INTRAMUSCULAR; INTRAVENOUS at 05:09

## 2021-07-19 RX ADMIN — IPRATROPIUM BROMIDE AND ALBUTEROL SULFATE: 2.5; .5 SOLUTION RESPIRATORY (INHALATION) at 04:57

## 2021-07-19 RX ADMIN — ACETAMINOPHEN 650 MG: 325 TABLET, FILM COATED ORAL at 08:29

## 2021-07-19 RX ADMIN — GUAIFENESIN 10 ML: 100 SOLUTION ORAL at 13:23

## 2021-07-19 RX ADMIN — FAMOTIDINE 10 MG: 10 TABLET ORAL at 19:47

## 2021-07-19 RX ADMIN — ACETAMINOPHEN 650 MG: 325 TABLET, FILM COATED ORAL at 19:46

## 2021-07-19 RX ADMIN — LEVOFLOXACIN 500 MG: 5 INJECTION, SOLUTION INTRAVENOUS at 08:21

## 2021-07-19 RX ADMIN — DOXYCYCLINE 100 MG: 100 CAPSULE ORAL at 19:47

## 2021-07-19 RX ADMIN — IPRATROPIUM BROMIDE AND ALBUTEROL SULFATE 3 ML: .5; 3 SOLUTION RESPIRATORY (INHALATION) at 21:51

## 2021-07-19 RX ADMIN — PREDNISONE 40 MG: 20 TABLET ORAL at 10:29

## 2021-07-19 RX ADMIN — FEXOFENADINE HCL 60 MG: 60 TABLET, FILM COATED ORAL at 11:47

## 2021-07-19 RX ADMIN — IPRATROPIUM BROMIDE AND ALBUTEROL SULFATE 3 ML: .5; 3 SOLUTION RESPIRATORY (INHALATION) at 09:53

## 2021-07-19 RX ADMIN — GUAIFENESIN 10 ML: 100 SOLUTION ORAL at 19:46

## 2021-07-19 ASSESSMENT — ENCOUNTER SYMPTOMS
SINUS PRESSURE: 1
CHILLS: 1
WHEEZING: 1
SHORTNESS OF BREATH: 1
COUGH: 1
FEVER: 0

## 2021-07-19 ASSESSMENT — MIFFLIN-ST. JEOR
SCORE: 1168.14
SCORE: 1099.38

## 2021-07-19 NOTE — ED PROVIDER NOTES
History   Chief Complaint:  Shortness of Breath     The history is provided by the patient.      Naina Zamora is a 75 year old female former smoker with history of anxiety, hyperlipidemia, and COPD from chronic bronchitis who presents with shortness of breath. The patient reports that her coughing and shortness of breath began Friday evening and that she was not able to get good sleep that night. Since then, the shortness of breath has worsened and she has had chills and a loss of appetite. She says she had been feeling better this afternoon but then felt her symptoms worsen shortly after taking her nightly medications. She notes that she has chest pain with the cough and says she has been coughing up mucus. These symptoms worsen when she lies down. She has also had sinus pressure recently, which she has been taking ibuprofen for. She was seen in the ED on 06/20/21 and 06/29/21 for similar episodes, and she then saw her PCP on 07/05/21. Her PCP took the patient off her duo neb and inhaler and told her to only use them for emergencies. Now she only takes spiriva for her respiratory problems. For the last few days, she has been needing her duo neb every four hours with her inhaler several times in between. Today, she notes she used her duo neb at 0000 and 2000 and then used her inhaler in the ED lobby. She denies any heart problems and does not take any blood pressure medications. She mentions that she smoked heavily from age 20 to 38.     Review of Systems   Constitutional: Positive for chills. Negative for fever.   HENT: Positive for sinus pressure.    Respiratory: Positive for cough, shortness of breath and wheezing.    Cardiovascular: Positive for chest pain.   All other systems reviewed and are negative.        Allergies:  Latex  Statins [Hmg-Coa-R Inhibitors]    Medications:  Synthroid  Albuterol inhaler  Albuterol Duoneb  Buspirone  Zithromax  Spiriva    Past Medical History:    Lung  "nodule  COPD  Hypothyroidism  Anxiety  Bradycardia  Alcohol abuse   Eczema  Chronic bronchitis    Past Surgical History:     section  Cholecystectomy  Tubal ligation  Colonoscopy    Family History:    CAD, brother/father  Diabetes, brother  Hypertension, brother  Hyperlipidemia, brother/father  Alzheimer disease, mother    Social History:  Smoking status: former smoker  Alcohol use: no  Drug use: no  PCP: Tia Barriga  Presents to the ED with daughter and daughter-in-law.    Physical Exam     Patient Vitals for the past 24 hrs:   BP Temp Temp src Pulse Resp SpO2 Height Weight   21 0607 (!) 157/77 -- -- -- -- 95 % -- --   21 0600 (!) 157/77 -- -- 93 -- (!) 87 % -- --   21 0512 (!) 179/99 -- -- 89 20 99 % -- --   21 0445 (!) 181/111 -- -- 82 -- -- -- --   21 0301 (!) 162/81 97.9  F (36.6  C) Oral 83 18 94 % 1.549 m (5' 1\") 72.6 kg (160 lb)       Physical Exam  Constitutional:  Cooperative but looks uncomfortable.  HENT:   Head:    Atraumatic.   Mouth/Throat:   Oropharynx is without erythema or exudate and mucous membranes are moist.   Eyes:    Conjunctivae normal and EOM are normal.      Pupils are equal, round, and reactive to light.   Neck:    Normal range of motion. Neck supple.   Cardiovascular:  Normal rate, regular rhythm, normal heart sounds and radial and dorsalis pedis pulses are 2+ and symmetric.    Pulmonary/Chest:  Diminished breath sounds throughout with scattered expiratory wheezing. Conversational dyspnea.  Abdominal:   Soft. Bowel sounds are normal.      No splenomegaly or hepatomegaly. No tenderness. No rebound.   Musculoskeletal:  Normal range of motion. No edema and no calf tenderness.   Neurological:  Alert. Normal strength. No cranial nerve deficit. GCS 15  Skin:    Skin is warm and dry.   Psychiatric:   Normal mood and affect.     Emergency Department Course     ECG:  ECG taken at 0333 ECG read at 0458  Normal sinus rhythm  Normal ECG  No significant change " as compared to prior, dated 06/29/2021.   Rate 88 bpm. IL interval 160 ms. QRS duration 76 ms. QT/QTc 340/411 ms. P-R-T axes 74 64 65.    Imaging:  Chest  XR:No convincing evidence of active cardiopulmonary disease.       Reading per radiology     Laboratory:  CBC: WBC 7.2, HGB 15.3,    BMP: Glucose 111(H), o/w WNL (Creatinine: 0.79)    BNP: 120  iStat Gases, POCT: O2 Sat 65(L)  Symptomatic COVID-19 PCR: Negative      Emergency Department Course:  Reviewed:  I reviewed the patient's nursing notes, vitals, past medical records, Care Everywhere.     Assessments:  0503 I performed an assessment and examination of the patient as noted above.      0731  I spoke to Dr. Ma of the hospitalist service who accepts the patient for admission. Findings and plan explained to the Patient who consents to admission. Discussed the patient with Dr. Ma, who will admit the patient to an observation bed for further monitoring, evaluation, and treatment.     Interventions:  0457 Albuterol Duoneb 3 mL nebulization  0509 Solu-Medrol 125 mg IV  0525 Zofran 4 mg IV injection   0821 Levaquin 500 mg IV  0829 Tylenol 650 mg PO    Disposition:  The patient was admitted to the hospital under the care of Dr. Ma.    Impression & Plan   Medical Decision Making:  Naina Zamora is a 75 year old female who presents for evaluation of shortness of breath, cough productive of green sputum and wheezing.  Signs and symptoms are consistent with COPD exacerbation.  A broad differential was considered including foreign body, reactive airway disease, pneumothorax, cardiac equivalent/ACS, viral induced wheezing, allergic phenomena, pneumonia, etc.  Patient feels improved after interventions here in ED but continues to have impairment in respiratory function including oxygen saturations in the upper 80s when off supplemental O2. Given this, I will hospitalize for further intervention.      There are no signs at this point of any other  serious etiologies including those mentioned above especially acute coronary syndrome. I doubt this is ACS given the classic story of COPD exacerbation given by the patient, the marked wheezing without rales and a nonspecific EKG despite 2 days of symptoms.  No signs of pneumonia.      Given change in sputum production, antibiotics are indicated and first dose given in ED.  Discussed patient with hospitalist who agreed with inpatient care.      Covid-19  Naina Zamora was evaluated during a global COVID-19 pandemic, which necessitated consideration that the patient might be at risk for infection with the SARS-CoV-2 virus that causes COVID-19.   Applicable protocols for evaluation were followed during the patient's care.   COVID-19 was considered as part of the patient's evaluation. The plan for testing is:  a test was obtained during this visit.    Diagnosis:    ICD-10-CM    1. COPD exacerbation (H)  J44.1    2. Hypoxemia  R09.02        Scribe Disclosure:  INgozi (trainee), am serving as a scribe at 5:03 AM on 7/19/2021 to document services personally performed by Raudel Moreno MD based on my observations and the provider's statements to me.      Scribe Disclosure:  IZaira (), am serving as a scribe at 7:00 AM on 7/19/2021 to document services personally performed by Raudel Moreno MD based on my observations and the provider's statements to me.        Raudel Moreno MD  07/19/21 0852

## 2021-07-19 NOTE — ED TRIAGE NOTES
"Patient states SOB since Fri.  States this is 3rd visit this month for SOB.  Taking all medications & still feeling SOB.  Has seen her PMD also.  Speaking in full sentences.  States \"I still just feel SOB\".  Skin appears pink.  "

## 2021-07-19 NOTE — ED NOTES
"[facility]  ED Nurse Handoff Report    ED Chief complaint: Shortness of Breath      ED Diagnosis:   Final diagnoses:   None       Code Status:  Admitting MD to assess.      Allergies:   Allergies   Allergen Reactions     Latex Rash     Statins [Hmg-Coa-R Inhibitors]        Patient Story: Patient states SOB since Fri.  States this is 3rd visit this month for SOB.  Taking all medications & still feeling SOB.  Has seen her PMD also.  Speaking in full sentences.  States \"I still just feel SOB\".  Skin appears pink.  Focused Assessment:  Patient appears to have increased WOB, audible expiratory wheezes, and speaking in 2-3 word sentences since arrival here in the ED. She is also hypertensive, other VS are stable. Duoneb given upon arrival in the room, MD notified of patient respiratory status.     Treatments and/or interventions provided:   Medications   methylPREDNISolone sodium succinate (solu-MEDROL) 125 mg/2 mL injection (125 mg  Given 7/19/21 5316)   ipratropium - albuterol 0.5 mg/2.5 mg/3 mL (DUONEB) neb solution 3 mL ( Nebulization Given 7/19/21 6576)   ondansetron (ZOFRAN) 2 MG/ML injection (4 mg  Given 7/19/21 5378)     XR Chest Port 1 View    (Results Pending)         Patient's response to treatments and/or interventions: Slightly improved, still unable to maintain >94% SpO2 on room air trial, placed back on 2 L supplemental O2 via NC.     To be done/followed up on inpatient unit:  Monitor respiratory status.     Does this patient have any cognitive concerns?: None    Activity level - Baseline/Home:  Independent  Activity Level - Current:   Independent    Patient's Preferred language: English   Needed?: No    Isolation: None  Infection: Not Applicable  Patient tested for COVID 19 prior to admission: YES  Bariatric?: No    Vital Signs:   Vitals:    07/19/21 0445 07/19/21 0512 07/19/21 0600 07/19/21 0607   BP: (!) 181/111 (!) 179/99 (!) 157/77 (!) 157/77   Pulse: 82 89 93    Resp:  20   "   Temp:       TempSrc:       SpO2:  99% (!) 87% 95%   Weight:       Height:           Cardiac Rhythm:     Was the PSS-3 completed:   Yes  What interventions are required if any?               Family Comments: Family members at the bedside.   OBS brochure/video discussed/provided to patient/family: Yes              Name of person given brochure if not patient: NA              Relationship to patient: NA    For the majority of the shift this patient's behavior was Green.   Behavioral interventions performed were  information and reassurance provided.  .    ED NURSE PHONE NUMBER: [phone]

## 2021-07-19 NOTE — PROGRESS NOTES
RECEIVING UNIT ED HANDOFF REVIEW    ED Nurse Handoff Report was reviewed by: Sumit Adames RN on July 19, 2021 at 8:16 AM

## 2021-07-19 NOTE — PLAN OF CARE
"PRIMARY DIAGNOSIS: \"GENERIC\" NURSING  OUTPATIENT/OBSERVATION GOALS TO BE MET BEFORE DISCHARGE:  ADLs back to baseline: Yes    Activity and level of assistance: Ambulating independently.    Pain status: Pain free.    Return to near baseline physical activity: Yes     Discharge Planner Nurse   Safe discharge environment identified: Yes  Barriers to discharge: Yes       Entered by: Sylvie Romero 07/19/2021 3:44 PM     Please review provider order for any additional goals.   Nurse to notify provider when observation goals have been met and patient is ready for discharge.  "

## 2021-07-19 NOTE — PROGRESS NOTES
A&Ox4. VSS on 1LPM NC. Up independently. IV SL. SOB. SCHERER. Cough; prn robitussin. Tele SR. Nebs and prednisone. Likely discharge home tomorrow.

## 2021-07-19 NOTE — PROGRESS NOTES
A/Ox4, VSS, on Oxygen at 1L. Up independently. C/O SOB, Duoneb administered with effectiveness. C/O chest pain and headache when coughing, scheduled Tylenol and Guaifenesin administered.     Patient requests all Lab and Radiology Results on their Discharge Instructions

## 2021-07-19 NOTE — PLAN OF CARE
"PRIMARY DIAGNOSIS: \"GENERIC\" NURSING  OUTPATIENT/OBSERVATION GOALS TO BE MET BEFORE DISCHARGE:  ADLs back to baseline: Yes    Activity and level of assistance: Ambulating independently.    Pain status: Pain free.    Return to near baseline physical activity: No     Discharge Planner Nurse   Safe discharge environment identified: No  Barriers to discharge: Yes       Entered by: Sumit Adames 07/19/2021 11:35 AM     Please review provider order for any additional goals.   Nurse to notify provider when observation goals have been met and patient is ready for discharge.  "

## 2021-07-19 NOTE — PHARMACY-ADMISSION MEDICATION HISTORY
Pharmacy Medication History  Admission medication history interview status for the 7/19/2021  admission is complete. See EPIC admission navigator for prior to admission medications     Location of Interview: Patient room  Medication history sources: Patient    Significant changes made to the medication list:  Changed buspirone to as needed  Added Spiriva, melatonin, Benadryl  Not taking azithromycin and prednisone     In the past week, patient estimated taking medication this percent of the time: greater than 90%    Additional medication history information:   Taking buspirone as needed.  Finished azithromycin and prednisone a couple of weeks ago.    Medication reconciliation completed by provider prior to medication history? No    Time spent in this activity: 10 minutes    Prior to Admission medications    Medication Sig Last Dose Taking? Auth Provider   albuterol (PROAIR HFA/PROVENTIL HFA/VENTOLIN HFA) 108 (90 Base) MCG/ACT inhaler Inhale 1-2 puffs into the lungs every 4 hours as needed for shortness of breath / dyspnea RPN Yes Unknown, Entered By History   busPIRone (BUSPAR) 10 MG tablet Take 10 mg by mouth 2 times daily as needed (anxiety, difficulty breathing)  PRN Yes Unknown, Entered By History   calcium carbonate (OS-MAMIE) 1500 (600 Ca) MG tablet Take 600 mg by mouth every evening  7/18/2021 at Unknown time Yes Unknown, Entered By History   diphenhydrAMINE (BENADRYL) 25 MG tablet Take 12.5 mg by mouth nightly as needed for sleep  Yes Unknown, Entered By History   dorzolamide-timolol (COSOPT) 2-0.5 % ophthalmic solution Place 1 drop into both eyes 2 times daily  7/18/2021 at PM Yes Reported, Patient   ipratropium - albuterol 0.5 mg/2.5 mg/3 mL (DUONEB) 0.5-2.5 (3) MG/3ML neb solution Take 1 vial (3 mLs) by nebulization every 6 hours as needed for shortness of breath / dyspnea or wheezing  Yes Sean Chao PA   latanoprost (XALATAN) 0.005 % ophthalmic solution Place 1 drop into both eyes At Bedtime.  7/18/2021 at Unknown time Yes Reported, Patient   levothyroxine (SSYNTHROID,LEVOTHROID) 100 MCG tablet Take 1 tablet by mouth daily. 7/18/2021 at Unknown time Yes Leslee Orozco NP   magnesium oxide (MAG-OX) 400 MG tablet Take 400 mg by mouth every evening  7/18/2021 at Unknown time Yes Unknown, Entered By History   melatonin 3 MG tablet Take 1 mg by mouth nightly as needed for sleep  Yes Unknown, Entered By History   tiotropium (SPIRIVA RESPIMAT) 2.5 MCG/ACT inhaler Inhale 2 puffs into the lungs At Bedtime 7/18/2021 at Unknown time Yes Unknown, Entered By History   Vitamin D3 (CHOLECALCIFEROL) 25 mcg (1000 units) tablet Take 1 tablet by mouth daily  7/18/2021 at Unknown time Yes Unknown, Entered By History   azithromycin (ZITHROMAX) 250 MG tablet Take 1 tablet (250 mg) by mouth daily  Patient not taking: Reported on 7/19/2021 Not Taking at Unknown time  Sean Chao PA   predniSONE (DELTASONE) 10 MG tablet 4 tabs daily for 2 days, then 3 tabs daily for 2 days, then 2 tabs daily for 2 days, then 1 tab daily for 2 days, then stop  Patient not taking: Reported on 7/19/2021 Not Taking at Unknown time  Sean Chao PA   predniSONE (DELTASONE) 20 MG tablet Take two tablets (= 40mg) each day for 5 (five) days  Patient not taking: Reported on 7/19/2021 Not Taking at Unknown time  Daryl Abreu MD       The information provided in this note is only as accurate as the sources available at the time of update(s)

## 2021-07-19 NOTE — H&P
[facility]    History and Physical - Hospitalist Service       Date of Admission:  7/19/2021    Assessment & Plan   Naina Zamora is a 75 year old female with past medical history significant for COPD, HLD, hypothyroidism, former tobacco use, and recent admission 6/20-6/22, 2021 for COPD exacerbation admitted on 7/19/21 with shortness of breath.      COPD exacerbation  Acute hypoxic respiratory failure  Pt presented to the ED with shortness of breath. With recent hospitalization for COPD exacerbation which required prolonged steroid taper. Presented with 3-day history of worsening sob and productive cough. Oxygen values to 87% on room air, requiring 1L supplemental oxygen. CXR neg. Received IV solumedrol and levaquin in ED.  - Duobebs Q4H scheduled and albuterol Q2H PRN   - Prednisone 40mg daily  - Change to PO doxycycline x7 day course  - Wean supplemental oxygen as able  - Respiratory therapy consult   - Smoking, marijuana cessation  - Add Allegra     Hx Fatty liver vs alcoholic liver disease   - outpatient follow-up     Mood disorder   - resume Buspar PTA     Hypothyroidism   - Levothyroxine PTA, hold while on obs.      Diet:   regular  DVT Prophylaxis: Ambulate every shift  Fragoso Catheter: Not present  Central Lines: None  Code Status:   Full code    Disposition Plan   Expected discharge: recommended to prior living arrangement once O2 use less than 1 liters/minute.     The patient's care was discussed with the Attending Physician, Dr. Ma, Bedside Nurse and Patient.    Glen Hoyos PA-C  [facility]  Securely message with the Vocera Web Console (learn more here)  Text page via [employer] Paging/Directory      ______________________________________________________________________    Chief Complaint   sob    History is obtained from the patient and electronic health record    History of Present Illness   Naina Zamora is a 75 year old female with past  "medical history significant for COPD, HLD, hypothyroidism, former tobacco use, and recent admission 6/20-6/22, 2021 for COPD exacerbation admitted on 7/19/21 with shortness of breath and COPD exacerbation. Patient reports she was doing well at home following prolonged steroid taper, had transitioned to sole treatment with Spiriva and managing well. Reports on Friday, \"something triggered it.\" She developed shortness of breath, worsening cough, and wheeze. She reports trying to use her duonebs and albuterol inhaler sporadically without marked improvement. Due to ongoing symptoms and worsening shortness of breath, she presented to ED for evaluation.    On arrival in ED, pt was noted to be hypoxic to 87% on room air. She was also noted to have diffuse wheezing and conversational shortness of breath. She was given oxygen and duoneb breathing treatments with mild improvement. She was also provided IV solumedrol and levaquin. Workup was negative for evidence of pneumonia. Due to ongoing hypoxia and likely COPD exacerbation, she was discussed with hospitalist for admission.    Pt is presently evaluated in hospital room. She reports breathing has improved with treatments in ED and supplemental oxygen. She does admit to ongoing use of marijuana in vape and smoke form, which she has been using multiple times per week but will abstain. Afebrile, has not felt feverish over weekend. No cp. No nausea/vomiting. Some mildly diminished appetite without abdominal pain.    Review of Systems    The 10 point Review of Systems is negative other than noted in the HPI or here.     Past Medical History    I have reviewed this patient's medical history and updated it with pertinent information if needed.   Past Medical History:   Diagnosis Date     Eczema      Elevated lipids      Glaucoma      Hypothyroidism        Past Surgical History   I have reviewed this patient's surgical history and updated it with pertinent information if " needed.  Past Surgical History:   Procedure Laterality Date      SECTION       CHOLECYSTECTOMY       DILATION AND CURETTAGE         Social History   I have reviewed this patient's social history and updated it with pertinent information if needed.  Social History     Tobacco Use     Smoking status: Never Smoker     Smokeless tobacco: Never Used   Substance Use Topics     Alcohol use: No     Drug use: No       Family History   I have reviewed this patient's family history and updated it with pertinent information if needed.  Family History   Problem Relation Age of Onset     C.A.D. Brother      C.A.D. Father      Diabetes Brother      Hypertension Brother      Alzheimer Disease Mother        Prior to Admission Medications   Prior to Admission Medications   Prescriptions Last Dose Informant Patient Reported? Taking?   Vitamin D3 (CHOLECALCIFEROL) 25 mcg (1000 units) tablet 2021 at Unknown time Self Yes Yes   Sig: Take 1 tablet by mouth daily    albuterol (PROAIR HFA/PROVENTIL HFA/VENTOLIN HFA) 108 (90 Base) MCG/ACT inhaler RPN Self Yes Yes   Sig: Inhale 1-2 puffs into the lungs every 4 hours as needed for shortness of breath / dyspnea   azithromycin (ZITHROMAX) 250 MG tablet Not Taking at Unknown time  No No   Sig: Take 1 tablet (250 mg) by mouth daily   Patient not taking: Reported on 2021   busPIRone (BUSPAR) 10 MG tablet PRN Self Yes Yes   Sig: Take 10 mg by mouth 2 times daily as needed (anxiety, difficulty breathing)    calcium carbonate (OS-MAMIE) 1500 (600 Ca) MG tablet 2021 at Unknown time Self Yes Yes   Sig: Take 600 mg by mouth every evening    diphenhydrAMINE (BENADRYL) 25 MG tablet   Yes Yes   Sig: Take 12.5 mg by mouth nightly as needed for sleep   dorzolamide-timolol (COSOPT) 2-0.5 % ophthalmic solution 2021 at PM Self Yes Yes   Sig: Place 1 drop into both eyes 2 times daily    ipratropium - albuterol 0.5 mg/2.5 mg/3 mL (DUONEB) 0.5-2.5 (3) MG/3ML neb solution   No Yes   Sig:  Take 1 vial (3 mLs) by nebulization every 6 hours as needed for shortness of breath / dyspnea or wheezing   latanoprost (XALATAN) 0.005 % ophthalmic solution 2021 at Unknown time Self Yes Yes   Sig: Place 1 drop into both eyes At Bedtime.   levothyroxine (SSYNTHROID,LEVOTHROID) 100 MCG tablet 2021 at Unknown time Self No Yes   Sig: Take 1 tablet by mouth daily.   magnesium oxide (MAG-OX) 400 MG tablet 2021 at Unknown time Self Yes Yes   Sig: Take 400 mg by mouth every evening    melatonin 3 MG tablet   Yes Yes   Sig: Take 1 mg by mouth nightly as needed for sleep   predniSONE (DELTASONE) 10 MG tablet Not Taking at Unknown time  No No   Si tabs daily for 2 days, then 3 tabs daily for 2 days, then 2 tabs daily for 2 days, then 1 tab daily for 2 days, then stop   Patient not taking: Reported on 2021   predniSONE (DELTASONE) 20 MG tablet Not Taking at Unknown time  No No   Sig: Take two tablets (= 40mg) each day for 5 (five) days   Patient not taking: Reported on 2021   tiotropium (SPIRIVA RESPIMAT) 2.5 MCG/ACT inhaler 2021 at Unknown time  Yes Yes   Sig: Inhale 2 puffs into the lungs At Bedtime      Facility-Administered Medications: None     Allergies   Allergies   Allergen Reactions     Latex Rash     Statins [Hmg-Coa-R Inhibitors]        Physical Exam   Vital Signs: Temp: 97.9  F (36.6  C) Temp src: Oral BP: (!) 148/65 Pulse: 84   Resp: 20 SpO2: 93 % O2 Device: Nasal cannula Oxygen Delivery: 2 LPM  Weight: 160 lbs 0 oz    GEN: well-developed, well-nourished, appears comfortable  HEENT: NCAT, EOM intact bilaterally, oxygen tubing in bilat nares, mouth patent, mucous membranes dry  CHEST: lungs CTA bilaterally, no increased work of breathing, no wheeze, crackles, rhonchi  HEART: RRR, S1 & S2, no murmur  ABD: soft, nontender, nondistended, no guarding or rigidity, +BS in all 4 quadrants  MSK: AROM bilateral UE/LE, pedal & radial pulses 2+ bilaterally  NEURO: awake, alert, oriented to  name, place, and time. CN II-XII grossly intact. Sensation grossly intact to light touch.   SKIN: warm & dry without rash, no pedal edema    Data   Data reviewed today: I reviewed all medications, new labs and imaging results over the last 24 hours. I personally reviewed no images or EKG's today.    Recent Labs   Lab 07/19/21  0459   WBC 7.2   HGB 15.3   MCV 97         POTASSIUM 4.3   CHLORIDE 102   CO2 30   BUN 9   CR 0.79   ANIONGAP 4   MAMIE 9.5   *     Recent Results (from the past 24 hour(s))   XR Chest Port 1 View    Narrative    EXAM: CHEST SINGLE VIEW PORTABLE  LOCATION: [facility]  DATE/TIME: 7/19/2021 5:57 AM    INDICATION: Shortness of breath.  COMPARISON: 06/20/2021.    FINDINGS: No convincing pulmonary opacities. Normal size cardiac silhouette. Atherosclerotic calcification in the thoracic aorta.      Impression    IMPRESSION: No convincing evidence of active cardiopulmonary disease.

## 2021-07-20 ENCOUNTER — APPOINTMENT (OUTPATIENT)
Dept: CARDIOLOGY | Facility: CLINIC | Age: 76
DRG: 190 | End: 2021-07-20
Attending: HOSPITALIST
Payer: COMMERCIAL

## 2021-07-20 LAB — LVEF ECHO: NORMAL

## 2021-07-20 PROCEDURE — 999N000157 HC STATISTIC RCP TIME EA 10 MIN: Mod: 76

## 2021-07-20 PROCEDURE — 999N000208 ECHOCARDIOGRAM COMPLETE

## 2021-07-20 PROCEDURE — 94640 AIRWAY INHALATION TREATMENT: CPT | Mod: 76

## 2021-07-20 PROCEDURE — 120N000004 HC R&B MS OVERFLOW

## 2021-07-20 PROCEDURE — 250N000013 HC RX MED GY IP 250 OP 250 PS 637: Performed by: HOSPITALIST

## 2021-07-20 PROCEDURE — 94640 AIRWAY INHALATION TREATMENT: CPT

## 2021-07-20 PROCEDURE — 93306 TTE W/DOPPLER COMPLETE: CPT | Mod: 26 | Performed by: INTERNAL MEDICINE

## 2021-07-20 PROCEDURE — 250N000013 HC RX MED GY IP 250 OP 250 PS 637: Performed by: PHYSICIAN ASSISTANT

## 2021-07-20 PROCEDURE — 255N000002 HC RX 255 OP 636: Performed by: HOSPITALIST

## 2021-07-20 PROCEDURE — 250N000009 HC RX 250: Performed by: PHYSICIAN ASSISTANT

## 2021-07-20 PROCEDURE — 250N000012 HC RX MED GY IP 250 OP 636 PS 637: Performed by: HOSPITALIST

## 2021-07-20 PROCEDURE — 99233 SBSQ HOSP IP/OBS HIGH 50: CPT | Performed by: HOSPITALIST

## 2021-07-20 PROCEDURE — 250N000011 HC RX IP 250 OP 636: Performed by: PHYSICIAN ASSISTANT

## 2021-07-20 PROCEDURE — G0378 HOSPITAL OBSERVATION PER HR: HCPCS

## 2021-07-20 PROCEDURE — 999N000147 HC STATISTIC PT IP EVAL DEFER

## 2021-07-20 PROCEDURE — 258N000003 HC RX IP 258 OP 636: Performed by: PHYSICIAN ASSISTANT

## 2021-07-20 RX ORDER — LEVALBUTEROL 1.25 MG/.5ML
0.63 SOLUTION, CONCENTRATE RESPIRATORY (INHALATION) EVERY 4 HOURS
Status: DISCONTINUED | OUTPATIENT
Start: 2021-07-20 | End: 2021-07-20

## 2021-07-20 RX ORDER — METHYLPREDNISOLONE SODIUM SUCCINATE 125 MG/2ML
60 INJECTION, POWDER, LYOPHILIZED, FOR SOLUTION INTRAMUSCULAR; INTRAVENOUS EVERY 12 HOURS
Status: COMPLETED | OUTPATIENT
Start: 2021-07-20 | End: 2021-07-21

## 2021-07-20 RX ORDER — MONTELUKAST SODIUM 10 MG/1
10 TABLET ORAL AT BEDTIME
Status: DISCONTINUED | OUTPATIENT
Start: 2021-07-20 | End: 2021-07-22 | Stop reason: HOSPADM

## 2021-07-20 RX ORDER — LEVALBUTEROL INHALATION SOLUTION 0.31 MG/3ML
0.63 SOLUTION RESPIRATORY (INHALATION) EVERY 4 HOURS
Status: DISCONTINUED | OUTPATIENT
Start: 2021-07-20 | End: 2021-07-20

## 2021-07-20 RX ORDER — LEVALBUTEROL 1.25 MG/.5ML
0.63 SOLUTION, CONCENTRATE RESPIRATORY (INHALATION) EVERY 4 HOURS
Status: DISCONTINUED | OUTPATIENT
Start: 2021-07-20 | End: 2021-07-22 | Stop reason: HOSPADM

## 2021-07-20 RX ORDER — GUAIFENESIN 600 MG/1
1200 TABLET, EXTENDED RELEASE ORAL 2 TIMES DAILY
Status: DISCONTINUED | OUTPATIENT
Start: 2021-07-20 | End: 2021-07-22 | Stop reason: HOSPADM

## 2021-07-20 RX ORDER — BUSPIRONE HYDROCHLORIDE 10 MG/1
10 TABLET ORAL 2 TIMES DAILY PRN
Status: DISCONTINUED | OUTPATIENT
Start: 2021-07-20 | End: 2021-07-22 | Stop reason: HOSPADM

## 2021-07-20 RX ADMIN — GUAIFENESIN 1200 MG: 600 TABLET, EXTENDED RELEASE ORAL at 19:54

## 2021-07-20 RX ADMIN — LEVALBUTEROL HYDROCHLORIDE 0.63 MG: 1.25 SOLUTION, CONCENTRATE RESPIRATORY (INHALATION) at 13:06

## 2021-07-20 RX ADMIN — FEXOFENADINE HCL 60 MG: 60 TABLET, FILM COATED ORAL at 08:46

## 2021-07-20 RX ADMIN — SODIUM CHLORIDE 500 ML: 9 INJECTION, SOLUTION INTRAVENOUS at 18:15

## 2021-07-20 RX ADMIN — LEVALBUTEROL HYDROCHLORIDE 0.63 MG: 1.25 SOLUTION, CONCENTRATE RESPIRATORY (INHALATION) at 16:41

## 2021-07-20 RX ADMIN — IPRATROPIUM BROMIDE AND ALBUTEROL SULFATE 3 ML: .5; 3 SOLUTION RESPIRATORY (INHALATION) at 09:21

## 2021-07-20 RX ADMIN — IPRATROPIUM BROMIDE AND ALBUTEROL SULFATE 3 ML: .5; 3 SOLUTION RESPIRATORY (INHALATION) at 02:26

## 2021-07-20 RX ADMIN — LEVALBUTEROL HYDROCHLORIDE 0.63 MG: 1.25 SOLUTION, CONCENTRATE RESPIRATORY (INHALATION) at 19:59

## 2021-07-20 RX ADMIN — DOXYCYCLINE 100 MG: 100 CAPSULE ORAL at 19:54

## 2021-07-20 RX ADMIN — IPRATROPIUM BROMIDE 0.5 MG: 0.5 SOLUTION RESPIRATORY (INHALATION) at 19:58

## 2021-07-20 RX ADMIN — ACETAMINOPHEN 650 MG: 325 TABLET, FILM COATED ORAL at 19:12

## 2021-07-20 RX ADMIN — DOXYCYCLINE 100 MG: 100 CAPSULE ORAL at 08:46

## 2021-07-20 RX ADMIN — BUSPIRONE HYDROCHLORIDE 10 MG: 10 TABLET ORAL at 22:57

## 2021-07-20 RX ADMIN — LEVALBUTEROL HYDROCHLORIDE 0.63 MG: 1.25 SOLUTION, CONCENTRATE RESPIRATORY (INHALATION) at 22:57

## 2021-07-20 RX ADMIN — FAMOTIDINE 10 MG: 10 TABLET ORAL at 08:47

## 2021-07-20 RX ADMIN — PREDNISONE 60 MG: 20 TABLET ORAL at 08:46

## 2021-07-20 RX ADMIN — HUMAN ALBUMIN MICROSPHERES AND PERFLUTREN 9 ML: 10; .22 INJECTION, SOLUTION INTRAVENOUS at 08:43

## 2021-07-20 RX ADMIN — IPRATROPIUM BROMIDE 0.5 MG: 0.5 SOLUTION RESPIRATORY (INHALATION) at 22:58

## 2021-07-20 RX ADMIN — GUAIFENESIN 10 ML: 100 SOLUTION ORAL at 04:30

## 2021-07-20 RX ADMIN — FAMOTIDINE 10 MG: 10 TABLET ORAL at 19:54

## 2021-07-20 RX ADMIN — METHYLPREDNISOLONE SODIUM SUCCINATE 62.5 MG: 125 INJECTION, POWDER, FOR SOLUTION INTRAMUSCULAR; INTRAVENOUS at 19:50

## 2021-07-20 RX ADMIN — GUAIFENESIN 1200 MG: 600 TABLET, EXTENDED RELEASE ORAL at 08:46

## 2021-07-20 RX ADMIN — IPRATROPIUM BROMIDE 0.5 MG: 0.5 SOLUTION RESPIRATORY (INHALATION) at 16:41

## 2021-07-20 RX ADMIN — MONTELUKAST 10 MG: 10 TABLET, FILM COATED ORAL at 22:57

## 2021-07-20 ASSESSMENT — ACTIVITIES OF DAILY LIVING (ADL)
ADLS_ACUITY_SCORE: 18
ADLS_ACUITY_SCORE: 18

## 2021-07-20 NOTE — PROGRESS NOTES
Observation Goals:    -diagnostic tests and consults completed and resulted - not met  -vital signs normal or at patient baseline - not met  -dyspnea improved and O2 sats greater than 88% on room air or prior home oxygen levels - not met  -returns to baseline functional status - met

## 2021-07-20 NOTE — PROGRESS NOTES
[facility]    Hospitalist Progress Note    Assessment & Plan   Naina Zamora is a 75 year old female who was admitted on 7/19/2021.     Past medical history significant for COPD, HLD, hypothyroidism, former tobacco use, and recent admission 6/20-6/22, 2021 for COPD exacerbation admitted on 7/19/21 with shortness of breath.      COPD exacerbation  Acute hypoxic respiratory failure  Pt presented to the ED with shortness of breath. With recent hospitalization for COPD exacerbation which required prolonged steroid taper. Presented with 3-day history of worsening SOB and productive cough. Oxygen values to 87% on room air, requiring 1L supplemental oxygen. CXR neg. Received IV solumedrol and levaquin in ED.  - Stopped DuoNebs as patient has been feeling jittery and developed palpitations (tachycardic).   --Start scheduled Xopenex and ipratropium neb therapy.    - Patient continues to feel short of breath and has a wheeze by the end of the visit.   --Stop PO Prednisone mg/d and start IV Solumedrol   Mg BID.    - Change to PO doxycycline x7 day course.  - Wean supplemental oxygen as able (continued on 1 L and can not be weaned off).    - Respiratory therapy consult.  - Smoking, marijuana cessation.  - Add Allegra.  - Mucinex 1200 mg BID.    - IS/Flutter valve.       Hx Fatty liver vs alcoholic liver disease   - Outpatient follow-up.     Mood disorder   - Resumed on PTA Buspar.     Hypothyroidism   - Levothyroxine PTA, hold while on obs.     Risk Factors Present on Admission                     Diet: Regular Diet Adult     DVT Prophylaxis: Low Risk/Ambulatory with no VTE prophylaxis indicated   Fragoso Catheter: Not present  Code Status: Full Code     Disposition Plan    Expected discharge: 1-2 days recommended to TBD once back to baseline RA and COPD exacerbation has improved/resolved.   Entered: King Finnegan PA-C 07/20/2021, 4:53 AM        The patient's care was discussed with the  Bedside Nurse and Patient.      The patient has been discussed with Dr. Maxwell, who agrees with the assessment and plan at this time.  Dr. Maxwell will evaluate the patient independently.      King Finnegan PA-C  [employer]  Securely message with the Vocera Web Console (learn more here)  Text page via [facility] Paging/Directory      Interval History   Patient was seen in her hospital room.  She was seated in bed upon arrival.  She appeared flush and indicated that she feels unwell and exhausted.  She feels feverish as well as short of breath and with chest pain (secondary to cough) and abdominal pain (secondary to cough).  She mentioned she has not had a bowel movement recently.      Patient indicated that the neb therapies were making her feel jittery and have palpitations.  WE reviewed plan for the day and current medications.  Discussed changing DuoNeb to an alternative as well as starting Mucinex.  We reviewed how to use the flutter valve which caused the patient to immediately start coughing/hacking.      -Data reviewed today: I reviewed all new labs and imaging results over the last 24 hours. I personally reviewed no images or EKG's today.    Physical Exam   Temp: (!) 96.4  F (35.8  C) Temp src: Oral BP: 125/52 Pulse: 109   Resp: 16 SpO2: 90 % O2 Device: None (Room air) Oxygen Delivery: 1 LPM    Vitals:    07/19/21 0301 07/19/21 0857   Weight: 72.6 kg (160 lb) 65.7 kg (144 lb 13.5 oz)     Vital Signs with Ranges  Temp:  [95.8  F (35.4  C)-96.4  F (35.8  C)] 95.8  F (35.4  C)  Pulse:  [] 104  Resp:  [16-21] 16  BP: (130-179)/(60-99) 130/60  SpO2:  [85 %-99 %] 95 %  No intake/output data recorded.      Constitutional: Awake, alert, cooperative, no apparent distress but appears flushed, ill and fatigued.  ENT: Normocephalic, without obvious abnormality, atraumatic, oral pharynx with moist mucus membranes, tonsils without erythema or exudates.  Neck: Supple, symmetrical, trachea  midline, no adenopathy.  Pulmonary: Tight sounding and minimal air flow appreciated at the lung fields.  Initially no wheeze but by the end of the visit an audible wheeze had developed.    Cardiovascular: Regular rate and rhythm, normal S1 and S2, no S3 or S4, and no murmur noted.  GI: Normal bowel sounds, soft, non-distended, non-tender.  Skin/Integumen: Clear.  Neuro: CN II-XII grossly intact.  Psych:  Alert and oriented x 3. Normal affect.  Extremities: No lower extremity edema noted, and calves are non-TTP bilaterally.       Medications       doxycycline hyclate  100 mg Oral BID     famotidine  10 mg Oral BID     fexofenadine  60 mg Oral Daily     ipratropium - albuterol 0.5 mg/2.5 mg/3 mL  3 mL Nebulization Q4H     predniSONE  60 mg Oral Daily       Data   Recent Labs   Lab 07/19/21  0500 07/19/21  0459   WBC  --  7.2   HGB  --  15.3   MCV  --  97   PLT  --  243   NA  --  136   POTASSIUM  --  4.3   CHLORIDE  --  102   CO2  --  30   BUN  --  9   CR  --  0.79   ANIONGAP  --  4   MAMIE  --  9.5   GLC  --  111*   TROPONIN <0.015  --        Recent Results (from the past 24 hour(s))   XR Chest Port 1 View    Narrative    EXAM: CHEST SINGLE VIEW PORTABLE  LOCATION: [facility]  DATE/TIME: 7/19/2021 5:57 AM    INDICATION: Shortness of breath.  COMPARISON: 06/20/2021.    FINDINGS: No convincing pulmonary opacities. Normal size cardiac silhouette. Atherosclerotic calcification in the thoracic aorta.      Impression    IMPRESSION: No convincing evidence of active cardiopulmonary disease.

## 2021-07-20 NOTE — PLAN OF CARE
PT: Orders received, chart reviewed, discussed with RN in rounds. Pt admitted under observation status with COPD exacerbation, acute hypoxic respiratory failure. Pt is ambulating independently and moving safely, breathing is improved. No concerns from nursing, they will amb pt in the hallway this AM and continue to wean O2 to RA. Pt has no skilled PT needs at this time. PT to complete orders.

## 2021-07-20 NOTE — PLAN OF CARE
Observation goals PRIOR TO DISCHARGE     Comments: -diagnostic tests and consults completed and resulted: NOT MET  -vital signs normal or at patient baseline: NOT MET  -dyspnea improved and O2 sats greater than 88% on room air or prior home oxygen levels: NOT MET  -returns to baseline functional status: MET   Nurse to notify provider when observation goals have been met and patient is ready for discharge.

## 2021-07-20 NOTE — PROVIDER NOTIFICATION
"MD Notification    Notified Person: MD    Notified Person Name: UZMA Emery    Notification Date/Time: 17:40     Notification Interaction:page    Purpose of Notification: \"Pt tachycardic, 's at rest, 140's with activity. Would you like to address?\"    Orders Received: 500 mL NS bolus, per charge RN- call RRT if HR continuously >130's.     Comments:      "

## 2021-07-20 NOTE — PLAN OF CARE
AVSS; O2 sats 93-95% on 1L/NC; lung sounds diminished; frequent cough helped with robitussen; pt up independently in room; voiding; pt declined second neb overnight.

## 2021-07-20 NOTE — PLAN OF CARE
"Plan of Care:  Continue to monitor and assess respiratory status.  Wean oxygen as tolerated per protocol.  Deliver respiratory medications as ordered per protocol.  Assess skin integrity at least once per shift.    Shift Notes:  Pt remained on room air-1L nasal cannula throughout the day.  First nebulizer was refused but I was called up a few hours later to give a prn nebulizer because pt stated they were feeling a little short of breath.  2nd nebulizer was given on schedule, and her last nebulizer was refused/contraindicated because she had the \"jitters\" shortly after her 2nd nebulizer.  There was no sign of skin redness or breakdown around the ears, the cheeks, or the nares, and the pt stated there was no soreness or pain on either of those sites either.    "

## 2021-07-20 NOTE — UTILIZATION REVIEW
Admission Status; Secondary Review Determination       Under the authority of the Utilization Management Committee, the utilization review process indicated a secondary review on the above patient. The review outcome is based on review of the medical records, discussions with staff, and applying clinical experience noted on the date of the review.     (x) Inpatient Status Appropriate - This patient's medical care is consistent with medical management for inpatient care and reasonable inpatient medical practice.     RATIONALE FOR DETERMINATION   74 yo female with obstructive airway disease who presented with shortness of breath and initially admitted to observation with COPD exacerbation. Had IV solumedrol in emergency department and hypoxia to 87% on room air. Currently on 1 L oxygen. Scheduled duonebs stopped due to tachycardia and jittery sensation and now getting xoponex nebs instead. Not discharging due to need for continuous oxygen and increased intensity of treatment to scheduled IV solumedrol every 12 hours. Inpatient admission is appropriate based on the Medicare guidelines.     This document was produced using voice recognition software.    The information on this document is developed by the utilization review team in order for the business office to ensure compliance. This only denotes the appropriateness of proper admission status and does not reflect the quality of care rendered.   The definitions of Inpatient Status and Observation Status used in making the determination above are those provided in the CMS Coverage Manual, Chapter 1 and Chapter 6, section 70.4.     Sincerely,   Vannessa Lerma MD  Utilization Review  Physician Advisor  [facility].

## 2021-07-20 NOTE — PLAN OF CARE
A&Ox4. VSS on 1LPM NC. Weaned to RA at 90 % but when drifting ff to sleep O2 was 85 % and oxygen was reapplied.  Up independently. IV SL. SOB. SCHERER. Cough; prn robitussin. Tele SR. Nebs and prednisone. Likely discharge home tomorrow

## 2021-07-21 LAB
ANION GAP SERPL CALCULATED.3IONS-SCNC: 5 MMOL/L (ref 3–14)
BUN SERPL-MCNC: 12 MG/DL (ref 7–30)
CALCIUM SERPL-MCNC: 9.5 MG/DL (ref 8.5–10.1)
CHLORIDE BLD-SCNC: 103 MMOL/L (ref 94–109)
CO2 SERPL-SCNC: 27 MMOL/L (ref 20–32)
CREAT SERPL-MCNC: 0.67 MG/DL (ref 0.52–1.04)
GFR SERPL CREATININE-BSD FRML MDRD: 87 ML/MIN/1.73M2
GLUCOSE BLD-MCNC: 130 MG/DL (ref 70–99)
GLUCOSE BLDC GLUCOMTR-MCNC: 132 MG/DL (ref 70–99)
MAGNESIUM SERPL-MCNC: 2.3 MG/DL (ref 1.6–2.3)
PHOSPHATE SERPL-MCNC: 1.9 MG/DL (ref 2.5–4.5)
POTASSIUM BLD-SCNC: 4.1 MMOL/L (ref 3.4–5.3)
SODIUM SERPL-SCNC: 135 MMOL/L (ref 133–144)

## 2021-07-21 PROCEDURE — 84100 ASSAY OF PHOSPHORUS: CPT | Performed by: HOSPITALIST

## 2021-07-21 PROCEDURE — 94640 AIRWAY INHALATION TREATMENT: CPT

## 2021-07-21 PROCEDURE — 250N000013 HC RX MED GY IP 250 OP 250 PS 637: Performed by: PHYSICIAN ASSISTANT

## 2021-07-21 PROCEDURE — 250N000009 HC RX 250: Performed by: PHYSICIAN ASSISTANT

## 2021-07-21 PROCEDURE — 99232 SBSQ HOSP IP/OBS MODERATE 35: CPT | Performed by: HOSPITALIST

## 2021-07-21 PROCEDURE — 83735 ASSAY OF MAGNESIUM: CPT | Performed by: HOSPITALIST

## 2021-07-21 PROCEDURE — 80048 BASIC METABOLIC PNL TOTAL CA: CPT | Performed by: HOSPITALIST

## 2021-07-21 PROCEDURE — 250N000011 HC RX IP 250 OP 636: Performed by: HOSPITALIST

## 2021-07-21 PROCEDURE — 999N000157 HC STATISTIC RCP TIME EA 10 MIN

## 2021-07-21 PROCEDURE — 250N000013 HC RX MED GY IP 250 OP 250 PS 637: Performed by: HOSPITALIST

## 2021-07-21 PROCEDURE — 120N000001 HC R&B MED SURG/OB

## 2021-07-21 PROCEDURE — 94640 AIRWAY INHALATION TREATMENT: CPT | Mod: 76

## 2021-07-21 PROCEDURE — 36415 COLL VENOUS BLD VENIPUNCTURE: CPT | Performed by: HOSPITALIST

## 2021-07-21 PROCEDURE — 250N000011 HC RX IP 250 OP 636: Performed by: PHYSICIAN ASSISTANT

## 2021-07-21 RX ORDER — AMOXICILLIN 250 MG
1-2 CAPSULE ORAL 2 TIMES DAILY
Status: DISCONTINUED | OUTPATIENT
Start: 2021-07-21 | End: 2021-07-22 | Stop reason: HOSPADM

## 2021-07-21 RX ORDER — PREDNISONE 20 MG/1
40 TABLET ORAL DAILY
Status: DISCONTINUED | OUTPATIENT
Start: 2021-07-22 | End: 2021-07-22 | Stop reason: HOSPADM

## 2021-07-21 RX ORDER — DORZOLAMIDE HYDROCHLORIDE AND TIMOLOL MALEATE 20; 5 MG/ML; MG/ML
1 SOLUTION/ DROPS OPHTHALMIC 2 TIMES DAILY
Status: DISCONTINUED | OUTPATIENT
Start: 2021-07-21 | End: 2021-07-22 | Stop reason: HOSPADM

## 2021-07-21 RX ORDER — LATANOPROST 50 UG/ML
1 SOLUTION/ DROPS OPHTHALMIC AT BEDTIME
Status: DISCONTINUED | OUTPATIENT
Start: 2021-07-21 | End: 2021-07-22 | Stop reason: HOSPADM

## 2021-07-21 RX ORDER — POLYETHYLENE GLYCOL 3350 17 G/17G
17 POWDER, FOR SOLUTION ORAL 2 TIMES DAILY PRN
Status: DISCONTINUED | OUTPATIENT
Start: 2021-07-21 | End: 2021-07-22 | Stop reason: HOSPADM

## 2021-07-21 RX ORDER — LEVOTHYROXINE SODIUM 100 UG/1
100 TABLET ORAL
Status: DISCONTINUED | OUTPATIENT
Start: 2021-07-21 | End: 2021-07-22 | Stop reason: HOSPADM

## 2021-07-21 RX ADMIN — METHYLPREDNISOLONE SODIUM SUCCINATE 62.5 MG: 125 INJECTION, POWDER, FOR SOLUTION INTRAMUSCULAR; INTRAVENOUS at 18:47

## 2021-07-21 RX ADMIN — LEVALBUTEROL HYDROCHLORIDE 0.63 MG: 1.25 SOLUTION, CONCENTRATE RESPIRATORY (INHALATION) at 23:59

## 2021-07-21 RX ADMIN — METHYLPREDNISOLONE SODIUM SUCCINATE 62.5 MG: 125 INJECTION, POWDER, FOR SOLUTION INTRAMUSCULAR; INTRAVENOUS at 07:49

## 2021-07-21 RX ADMIN — DOXYCYCLINE 100 MG: 100 CAPSULE ORAL at 08:09

## 2021-07-21 RX ADMIN — LEVALBUTEROL HYDROCHLORIDE 0.63 MG: 1.25 SOLUTION, CONCENTRATE RESPIRATORY (INHALATION) at 19:27

## 2021-07-21 RX ADMIN — GUAIFENESIN 1200 MG: 600 TABLET, EXTENDED RELEASE ORAL at 20:57

## 2021-07-21 RX ADMIN — LEVALBUTEROL HYDROCHLORIDE 0.63 MG: 1.25 SOLUTION, CONCENTRATE RESPIRATORY (INHALATION) at 07:58

## 2021-07-21 RX ADMIN — SENNOSIDES AND DOCUSATE SODIUM 2 TABLET: 8.6; 5 TABLET ORAL at 20:56

## 2021-07-21 RX ADMIN — POTASSIUM & SODIUM PHOSPHATES POWDER PACK 280-160-250 MG 2 PACKET: 280-160-250 PACK at 13:57

## 2021-07-21 RX ADMIN — IPRATROPIUM BROMIDE 0.5 MG: 0.5 SOLUTION RESPIRATORY (INHALATION) at 07:59

## 2021-07-21 RX ADMIN — LEVALBUTEROL HYDROCHLORIDE 0.63 MG: 1.25 SOLUTION, CONCENTRATE RESPIRATORY (INHALATION) at 16:13

## 2021-07-21 RX ADMIN — FAMOTIDINE 10 MG: 10 TABLET ORAL at 08:09

## 2021-07-21 RX ADMIN — DORZOLAMIDE HYDROCHLORIDE AND TIMOLOL MALEATE 1 DROP: 20; 5 SOLUTION/ DROPS OPHTHALMIC at 10:53

## 2021-07-21 RX ADMIN — GUAIFENESIN 1200 MG: 600 TABLET, EXTENDED RELEASE ORAL at 08:09

## 2021-07-21 RX ADMIN — IPRATROPIUM BROMIDE 0.5 MG: 0.5 SOLUTION RESPIRATORY (INHALATION) at 16:13

## 2021-07-21 RX ADMIN — SENNOSIDES AND DOCUSATE SODIUM 2 TABLET: 8.6; 5 TABLET ORAL at 10:17

## 2021-07-21 RX ADMIN — LEVALBUTEROL HYDROCHLORIDE 0.63 MG: 1.25 SOLUTION, CONCENTRATE RESPIRATORY (INHALATION) at 03:25

## 2021-07-21 RX ADMIN — LATANOPROST 1 DROP: 50 SOLUTION/ DROPS OPHTHALMIC at 22:21

## 2021-07-21 RX ADMIN — BUSPIRONE HYDROCHLORIDE 10 MG: 10 TABLET ORAL at 21:12

## 2021-07-21 RX ADMIN — LEVOTHYROXINE SODIUM 100 MCG: 100 TABLET ORAL at 08:09

## 2021-07-21 RX ADMIN — ACETAMINOPHEN 650 MG: 325 TABLET, FILM COATED ORAL at 08:21

## 2021-07-21 RX ADMIN — FAMOTIDINE 10 MG: 10 TABLET ORAL at 20:56

## 2021-07-21 RX ADMIN — LEVALBUTEROL HYDROCHLORIDE 0.63 MG: 1.25 SOLUTION, CONCENTRATE RESPIRATORY (INHALATION) at 11:26

## 2021-07-21 RX ADMIN — FEXOFENADINE HCL 60 MG: 60 TABLET, FILM COATED ORAL at 08:09

## 2021-07-21 RX ADMIN — IPRATROPIUM BROMIDE 0.5 MG: 0.5 SOLUTION RESPIRATORY (INHALATION) at 19:27

## 2021-07-21 RX ADMIN — MONTELUKAST 10 MG: 10 TABLET, FILM COATED ORAL at 21:12

## 2021-07-21 RX ADMIN — DOXYCYCLINE 100 MG: 100 CAPSULE ORAL at 20:56

## 2021-07-21 RX ADMIN — IPRATROPIUM BROMIDE 0.5 MG: 0.5 SOLUTION RESPIRATORY (INHALATION) at 11:26

## 2021-07-21 RX ADMIN — POTASSIUM & SODIUM PHOSPHATES POWDER PACK 280-160-250 MG 2 PACKET: 280-160-250 PACK at 20:56

## 2021-07-21 RX ADMIN — DORZOLAMIDE HYDROCHLORIDE AND TIMOLOL MALEATE 1 DROP: 20; 5 SOLUTION/ DROPS OPHTHALMIC at 22:20

## 2021-07-21 ASSESSMENT — ACTIVITIES OF DAILY LIVING (ADL)
ADLS_ACUITY_SCORE: 18

## 2021-07-21 NOTE — PLAN OF CARE
AOx4. VSS; attempting to wean off O2, currently on RA at a SPO2 of 91%. Tele SR. Up ind in room. Tolerating regular diet.  Scheduled nebs. Using flutter valve. Frequent coughs. PRN senna given. Phos 1.9; currently being replaced, recheck tomorrow.

## 2021-07-21 NOTE — PROGRESS NOTES
[facility]    Hospitalist Progress Note      Assessment & Plan   Naina ALEN Zamora is a 75 year old female who was admitted on 7/19/2021 with shortness of breath found to have COPD exacerbation with acute hypoxic respiratory failure    COPD exacerbation  Acute hypoxic respiratory failure  Pt presented to the ED with shortness of breath. With recent hospitalization for COPD exacerbation which required prolonged steroid taper. Presented with 3-day history of worsening SOB and productive cough. Oxygen values to 87% on room air, requiring 1L supplemental oxygen. CXR neg. Received IV solumedrol and levaquin in ED.  *Echocardiogram 7/20: EF 60%, no valve disease  - Stopped DuoNebs as patient has been feeling jittery and developed palpitations (tachycardic).               --continue scheduled Xopenex and ipratropium nebs, likely discharge with prescription when leaves hospital  - IV solumedrol 60mg BID, switch to prednisone 40mg daily on 7/22, then discharge on taper multiple days due to frequent hospital presentation for SOB  - PO doxycycline x7 day course.  - Wean supplemental oxygen as able (doing well on room air morning of 7/21)  - Respiratory therapy consult.  - recommended for marijuana cessation, but helps her glaucoma so difficult to wean totally  - Allegra daily, short term use recommended for high pollen count days, do not use daily due to possible worsening of glaucoma  - montelukast added daily, continue on discharge  - Mucinex 1200 mg BID.    - IS/Flutter valve.       Hx Fatty liver vs alcoholic liver disease   - Outpatient follow-up.     Mood disorder   - Resumed on PTA Buspar.     Hypothyroidism   - Levothyroxine resumed    Glaucoma  Resumed PTA eye drops  - follow up with PCP regarding medical marijuana    Constipation  BID senna-docusate, PRN miralax ordered  - encouraged ambulation    Hypophosphatemia  Replete per protocl    Risk Factors Present on Admission                    DVT Prophylaxis: Pneumatic Compression Devices  Code Status: Full Code  Expected discharge: 1 day, return to prior living arrangement once weaned off oxygen, coughing/SOB improved    Aspen Maxwell,   Hospitalist Service  [facility]  Securely message with the [employer] Web Console (learn more here)  Text Page (7am - 6pm) via [facility] Paging/Directory      Interval History   Patient seen and examined. Issues with prolonged coughing jag overnight which resulted in high heart rates. She is quite nervous about returning home and possibly having to come back to the hospital if she feels worse. Discussed plans for continued respiratory improvement today with wean of oxygen and improved cough prior to discharge. Discussed montelukast, zyrtec/allegra/benadryl PRN at home. She reports dry cough, but frequent. No chest pain. No nausea, vomiting.    -Data reviewed today: I reviewed all new labs and imaging results over the last 24 hours. I personally reviewed no images or EKG's today.    Physical Exam   Temp: (!) 96.6  F (35.9  C) Temp src: Oral BP: 136/58 Pulse: 78   Resp: 16 SpO2: 95 % O2 Device: Nasal cannula Oxygen Delivery: 1 LPM  Vitals:    07/19/21 0301 07/19/21 0857   Weight: 72.6 kg (160 lb) 65.7 kg (144 lb 13.5 oz)     Vital Signs with Ranges  Temp:  [95.7  F (35.4  C)-97.2  F (36.2  C)] 96.6  F (35.9  C)  Pulse:  [] 78  Resp:  [16] 16  BP: (121-144)/(52-74) 136/58  SpO2:  [90 %-98 %] 95 %  No intake/output data recorded.    Constitutional: Awake, alert, cooperative, mild anxiety  Respiratory: decreased breath sounds overall, no wheeze or rhonchi. Dry cough  Cardiovascular: Regular rhythm, HR near 100, normal S1 and S2, and no murmur noted  GI: Normal bowel sounds, soft, non-distended, non-tender  Skin/Integumen: No rashes, no cyanosis, no edema  Other:     Medications       dorzolamide-timolol  1 drop Both Eyes BID     doxycycline hyclate  100 mg Oral BID     famotidine  10 mg Oral  BID     fexofenadine  60 mg Oral Daily     guaiFENesin  1,200 mg Oral BID     ipratropium  0.5 mg Nebulization 4x daily     latanoprost  1 drop Both Eyes At Bedtime     levalbuterol  0.63 mg Nebulization Q4H     levothyroxine  100 mcg Oral QAM AC     methylPREDNISolone  62.5 mg Intravenous Q12H     montelukast  10 mg Oral At Bedtime     [START ON 7/22/2021] predniSONE  40 mg Oral Daily     senna-docusate  1-2 tablet Oral BID       Data   Recent Labs   Lab 07/21/21  0755 07/19/21  0500 07/19/21  0459   WBC  --   --  7.2   HGB  --   --  15.3   MCV  --   --  97   PLT  --   --  243   NA  --   --  136   POTASSIUM  --   --  4.3   CHLORIDE  --   --  102   CO2  --   --  30   BUN  --   --  9   CR  --   --  0.79   ANIONGAP  --   --  4   MAMIE  --   --  9.5   *  --  111*   TROPONIN  --  <0.015  --        No results found for this or any previous visit (from the past 24 hour(s)).

## 2021-07-21 NOTE — PLAN OF CARE
A&Ox4. Tachycardic, other VSS on 1LO2. Sats in low 90's (instructed by provider to keep pt on 1 L O2). Receiving scheduled nebs. IS and flutter valve encouraged. Ambulating ind. Frequent cough, improved throughout day.

## 2021-07-21 NOTE — PLAN OF CARE
A&Ox4, VSS except tachycardic 100-120's improved throughout night. 1L of oxygen/NC. Scheduled nebs. Using IS and flutter valve Frequent coughs, LS diminished.  Reg diet, indep in room. Tele: SR/ST Will continue to monitor.

## 2021-07-21 NOTE — PROVIDER NOTIFICATION
MD Notification    Notified Person: MD    Notified Person Name: Dr. Maxwell     Notification Date/Time: 7/21/21 12:43    Notification Interaction: Text page     Purpose of Notification: Obs 2 Phos back at 1.9. Can we get replacement protocol ordered?     Orders Received: replacement ordered.     Comments:

## 2021-07-22 VITALS
WEIGHT: 144.84 LBS | TEMPERATURE: 97.9 F | HEIGHT: 61 IN | BODY MASS INDEX: 27.35 KG/M2 | SYSTOLIC BLOOD PRESSURE: 147 MMHG | DIASTOLIC BLOOD PRESSURE: 79 MMHG | HEART RATE: 69 BPM | RESPIRATION RATE: 18 BRPM | OXYGEN SATURATION: 96 %

## 2021-07-22 PROCEDURE — 99239 HOSP IP/OBS DSCHRG MGMT >30: CPT | Performed by: HOSPITALIST

## 2021-07-22 PROCEDURE — 250N000013 HC RX MED GY IP 250 OP 250 PS 637: Performed by: HOSPITALIST

## 2021-07-22 PROCEDURE — 999N000157 HC STATISTIC RCP TIME EA 10 MIN

## 2021-07-22 PROCEDURE — 250N000009 HC RX 250: Performed by: PHYSICIAN ASSISTANT

## 2021-07-22 PROCEDURE — 250N000013 HC RX MED GY IP 250 OP 250 PS 637: Performed by: PHYSICIAN ASSISTANT

## 2021-07-22 PROCEDURE — 250N000012 HC RX MED GY IP 250 OP 636 PS 637: Performed by: HOSPITALIST

## 2021-07-22 PROCEDURE — 94640 AIRWAY INHALATION TREATMENT: CPT | Mod: 76

## 2021-07-22 PROCEDURE — 94640 AIRWAY INHALATION TREATMENT: CPT

## 2021-07-22 RX ORDER — GUAIFENESIN 600 MG/1
1200 TABLET, EXTENDED RELEASE ORAL 2 TIMES DAILY
Qty: 30 TABLET | Refills: 0 | Status: SHIPPED | OUTPATIENT
Start: 2021-07-22

## 2021-07-22 RX ORDER — FEXOFENADINE HCL 60 MG/1
60 TABLET, FILM COATED ORAL DAILY
Qty: 30 TABLET | Refills: 0 | Status: SHIPPED | OUTPATIENT
Start: 2021-07-23

## 2021-07-22 RX ORDER — MONTELUKAST SODIUM 10 MG/1
10 TABLET ORAL AT BEDTIME
Qty: 30 TABLET | Refills: 0 | Status: SHIPPED | OUTPATIENT
Start: 2021-07-22

## 2021-07-22 RX ORDER — DOXYCYCLINE 100 MG/1
100 CAPSULE ORAL 2 TIMES DAILY
Qty: 7 CAPSULE | Refills: 0 | Status: SHIPPED | OUTPATIENT
Start: 2021-07-22

## 2021-07-22 RX ORDER — PREDNISONE 10 MG/1
TABLET ORAL
Qty: 26 TABLET | Refills: 0 | Status: SHIPPED | OUTPATIENT
Start: 2021-07-23 | End: 2021-08-03

## 2021-07-22 RX ORDER — LEVALBUTEROL 1.25 MG/.5ML
1.25 SOLUTION, CONCENTRATE RESPIRATORY (INHALATION) 4 TIMES DAILY
Qty: 60 ML | Refills: 0 | Status: SHIPPED | OUTPATIENT
Start: 2021-07-22 | End: 2021-08-21

## 2021-07-22 RX ADMIN — DOXYCYCLINE 100 MG: 100 CAPSULE ORAL at 07:53

## 2021-07-22 RX ADMIN — LEVOTHYROXINE SODIUM 100 MCG: 100 TABLET ORAL at 06:36

## 2021-07-22 RX ADMIN — DORZOLAMIDE HYDROCHLORIDE AND TIMOLOL MALEATE 1 DROP: 20; 5 SOLUTION/ DROPS OPHTHALMIC at 07:53

## 2021-07-22 RX ADMIN — LEVALBUTEROL HYDROCHLORIDE 1.25 MG: 1.25 SOLUTION, CONCENTRATE RESPIRATORY (INHALATION) at 11:39

## 2021-07-22 RX ADMIN — LEVALBUTEROL HYDROCHLORIDE 1.25 MG: 1.25 SOLUTION, CONCENTRATE RESPIRATORY (INHALATION) at 07:06

## 2021-07-22 RX ADMIN — PREDNISONE 40 MG: 20 TABLET ORAL at 07:53

## 2021-07-22 RX ADMIN — IPRATROPIUM BROMIDE 0.5 MG: 0.5 SOLUTION RESPIRATORY (INHALATION) at 11:39

## 2021-07-22 RX ADMIN — FAMOTIDINE 10 MG: 10 TABLET ORAL at 07:52

## 2021-07-22 RX ADMIN — GUAIFENESIN 1200 MG: 600 TABLET, EXTENDED RELEASE ORAL at 07:53

## 2021-07-22 RX ADMIN — IPRATROPIUM BROMIDE 0.5 MG: 0.5 SOLUTION RESPIRATORY (INHALATION) at 07:07

## 2021-07-22 RX ADMIN — FEXOFENADINE HCL 60 MG: 60 TABLET, FILM COATED ORAL at 07:53

## 2021-07-22 RX ADMIN — POTASSIUM & SODIUM PHOSPHATES POWDER PACK 280-160-250 MG 2 PACKET: 280-160-250 PACK at 07:52

## 2021-07-22 ASSESSMENT — ACTIVITIES OF DAILY LIVING (ADL)
ADLS_ACUITY_SCORE: 19
ADLS_ACUITY_SCORE: 18

## 2021-07-22 NOTE — DISCHARGE SUMMARY
[facility]    Discharge Summary  Hospitalist    Date of Admission:  7/19/2021  Date of Discharge:  7/22/2021  Discharging Provider: Aspen Maxwell DO  Date of Service (when I saw the patient): 07/22/21    Discharge Diagnoses   COPD exacerbation  Acute hypoxic respiratory failure  Hx Fatty liver vs alcoholic liver disease   Mood disorder   Hypothyroidism   Glaucoma  Constipation  Hypophosphatemia    History of Present Illness   Naina Zamora is an 75 year old female who presented with shortness of breath found to have COPD exacerbation with acute hypoxic respiratory failure    Hospital Course   Naina Zamora was admitted on 7/19/2021.  The following problems were addressed during her hospitalization:    COPD exacerbation  Acute hypoxic respiratory failure, resolved  Pt presented to the ED with shortness of breath. With recent hospitalization for COPD exacerbation which required prolonged steroid taper. Presented with 3-day history of worsening SOB and productive cough. Oxygen values to 87% on room air, requiring 1L supplemental oxygen. CXR neg. Received IV solumedrol and levaquin in ED. Weaned to room air on 7/22, stable with O2 sat >88% with ambulation. Seems like her exacerbations are related to days when she has been outdoors on high pollen count days (grass). Reasonable to try to treat allergy component as potential trigger.  *Echocardiogram 7/20: EF 60%, no valve disease  - Stopped DuoNebs as patient has been feeling jittery and developed palpitations (tachycardic).               --continue scheduled Xopenex and ipratropium nebs QID on discharge, wean as able  - IV solumedrol 60mg BID initially, switched to prednisone 40mg daily on 7/22, discharge with 11 day taper of prednisone (10mg dose reduction every 3 days)  - PO doxycycline x7 day course, discharged with 3.5 more days  - recommended for marijuana cessation, but helps her glaucoma so difficult to wean totally--but there  may be contribution from smoking for her exacerbation (she has increased her intake of this medication to almost daily as she stopped drinking alcohol)  - Allegra daily, prescribed on discharge, short term use recommended for high pollen count days, do not use daily due to possible worsening of glaucoma  - montelukast added daily, continue on discharge  - she was encouraged to mask-up while with other people, as well as when she is outdoors on high pollen days.  - Mucinex 1200 mg BID for 5 days, then reduce to 600mg twice daily for ongoing congestion (gunk)  - continue PTA spiriva and PRN albuterol inhaler  - IF ongoing issues with COPD exacerbation after allergy appropriately treated, she will need to be stepped up on her COPD therapy from spiriva to combination inhaler like ICS/LABA (advair/breo/dulera/symbicort)   - follow up with PCP within one week     Hx Fatty liver vs alcoholic liver disease   - Outpatient follow-up.     Mood disorder   - Resumed on PTA Buspar.     Hypothyroidism   - Levothyroxine resumed     Glaucoma  Resumed PTA eye drops  - follow up with PCP regarding medical marijuana     Constipation- resolved  Bowel regimen in hospital, had BM on 7/21     Hypophosphatemia  Repleted per perla Maxwell, DO    Significant Results and Procedures   See above    Pending Results   NA    Code Status   Full Code       Primary Care Physician   Tia Barriga    Physical Exam   Temp: 97.9  F (36.6  C) Temp src: Oral BP: (!) 154/72 Pulse: 73   Resp: 16 SpO2: 98 % O2 Device: None (Room air)    Vitals:    07/19/21 0301 07/19/21 0857   Weight: 72.6 kg (160 lb) 65.7 kg (144 lb 13.5 oz)     Vital Signs with Ranges  Temp:  [97.4  F (36.3  C)-97.9  F (36.6  C)] 97.9  F (36.6  C)  Pulse:  [64-76] 73  Resp:  [16] 16  BP: (135-154)/(55-72) 154/72  SpO2:  [91 %-98 %] 98 %  No intake/output data recorded.    Patient seen and examined. Discussed medication changes and indications for medications at length. She is  feeling much better. Still with productive cough, but overall improved. No chest pain, nausea, vomiting. Walked well with RN without desaturation. She had a BM. She is stable for discharge to home with follow up with PCP next week.    Constitutional: Awake, alert, cooperative, mild anxiety  Eyes: Conjunctiva and pupils examined and normal.  HEENT: Moist mucous membranes, normal dentition.  Respiratory: decreased breath sounds overall, intermittent productive cough. No wheeze or rhonchi  Cardiovascular: Regular rate and rhythm, normal S1 and S2, and no murmur noted.  GI: Soft, non-distended, non-tender, normal bowel sounds.  Lymph/Hematologic: No anterior cervical or supraclavicular adenopathy.  Skin: No rashes, no cyanosis, no edema.  Musculoskeletal: No joint swelling, erythema or tenderness.  Neurologic: Cranial nerves 2-12 intact, normal strength and sensation.  Psychiatric: Alert, oriented to person, place and time, no obvious depression. Mild anxiety as above    Discharge Disposition   Discharged to home  Condition at discharge: Stable    Consultations This Hospital Stay   RESPIRATORY CARE IP CONSULT  PHYSICAL THERAPY ADULT IP CONSULT  SMOKING CESSATION PROGRAM IP CONSULT    Time Spent on this Encounter   IAspen DO, personally saw the patient today and spent greater than 30 minutes discharging this patient.    Discharge Orders      Reason for your hospital stay    COPD exacerbation, likely triggered by allergies     Follow-up and recommended labs and tests     Follow up with primary care provider, Tia Barriga, within 7 days to evaluate medication change and for hospital follow- up.  No follow up labs or test are needed.     Activity    Your activity upon discharge: activity as tolerated. Wear mask when outside on high pollen days     Discharge Instructions    1. For your Allergies, you were started on allegra. This is an over the counter allergy medication that is makes you a little less sleepy  than zyrtec, but doesn't need to be taken every few hours like benadryl. You should take this type of medication on days that are high with pollen and you will be outdoors, or if you start to feel like your COPD may be acting up due to allergy exposure. Do not take this daily all the time, as it may affect your glaucoma.    2. For allergies and COPD, you were started on montelukast. This is a medication that is taken once nightly to help reduce allergies in a way that worsens the lung part of COPD. You will take this all the time.    3. For your COPD, you are on multiple medications.    --Spiriva is a long acting medication that helps keep your COPD stable. You will continue to take this at night. If the allergy meds do not improve your COPD, this inhaler may need to be changed in the future.    --Albuterol inhaler is your rescue inhaler. You should keep this on you if you leave the house and use with spacer if you feel short of breath    --Duonebs (albuterol/ipratropium) are the nebulizer you used to have at home. Stop taking this and instead take Xopenex and ipratropium (two that you will combine together). You will take these at least four times daily,  by 4-6 hours each time. You should schedule them like this for next 5 days, then you can start weaning off and spacing out time between nebulizer until you feel your shortness of breath and sputum production (gunk) is stable.    --Mucinex is to help with gunk. Take two tablets (1200mg) twice daily for first 5 days to really decrease your gunk. Then reduce to 600mg (one tab) twice daily if you still feel you have some gunk.     --Prednisone reduces inflammation, but may also increase your anxiety. You are on taper dose of this medication. You had 40mg on 7/22 in the hospital and you will take this dose for two more days starting 7/23. On Sunday, you will start decreasing dose by 10mg every 3 days until you finish your prescription.    --Doxycycline is an  antibiotic. We use this to help inflammation in COPD, you will take this for ~3 more days to complete a total 7 day course.    4. For your anxiety, you take buspar. You may need to take this medication more frequently as the steroid (prednisone) may worsen you anxiety temporarily     Diet    Follow this diet upon discharge:  Regular Diet Adult     Discharge Medications   Current Discharge Medication List      START taking these medications    Details   doxycycline hyclate (VIBRAMYCIN) 100 MG capsule Take 1 capsule (100 mg) by mouth 2 times daily  Qty: 7 capsule, Refills: 0    Comments: Future refills by PCP Dr. Tia Barriga with phone number [phone].  Associated Diagnoses: COPD exacerbation (H)      fexofenadine (ALLEGRA) 60 MG tablet Take 1 tablet (60 mg) by mouth daily On high pollen count days that you will be outside  Qty: 30 tablet, Refills: 0    Comments: Future refills by PCP Dr. Tia Barriga with phone number [phone].  Associated Diagnoses: COPD exacerbation (H)      guaiFENesin (MUCINEX) 600 MG 12 hr tablet Take 2 tablets (1,200 mg) by mouth 2 times daily For 5 days, then take 600mg (1 tab) twice daily if ongoing congestion/gunk  Qty: 30 tablet, Refills: 0    Comments: Future refills by PCP Dr. Tia Barriga with phone number [phone].  Associated Diagnoses: COPD exacerbation (H)      ipratropium (ATROVENT) 0.02 % neb solution Take 2.5 mLs (0.5 mg) by nebulization 4 times daily  Qty: 300 mL, Refills: 0    Comments: Future refills by PCP Dr. Tia Barriga with phone number [phone].  Associated Diagnoses: COPD exacerbation (H)      levalbuterol (XOPENEX CONC) 1.25 MG/0.5ML neb solution Take 0.5 mLs (1.25 mg) by nebulization 4 times daily  Qty: 60 mL, Refills: 0    Comments: Future refills by PCP Dr. Tia Barriga with phone number [phone].  Associated Diagnoses: COPD exacerbation (H)      montelukast (SINGULAIR) 10 MG tablet Take 1 tablet (10 mg) by mouth At Bedtime  Qty: 30 tablet,  Refills: 0    Comments: Future refills by PCP Dr. Tia Barriga with phone number [phone].  Associated Diagnoses: COPD exacerbation (H)         CONTINUE these medications which have CHANGED    Details   predniSONE (DELTASONE) 10 MG tablet Take 4 tablets (40 mg) by mouth daily for 2 days, THEN 3 tablets (30 mg) daily for 3 days, THEN 2 tablets (20 mg) daily for 3 days, THEN 1 tablet (10 mg) daily for 3 days.  Qty: 26 tablet, Refills: 0    Comments: Future refills by PCP Dr. Tia Barriga with phone number [phone].  Associated Diagnoses: COPD exacerbation (H)         CONTINUE these medications which have NOT CHANGED    Details   albuterol (PROAIR HFA/PROVENTIL HFA/VENTOLIN HFA) 108 (90 Base) MCG/ACT inhaler Inhale 1-2 puffs into the lungs every 4 hours as needed for shortness of breath / dyspnea    Comments: Pharmacy may dispense brand covered by insurance (Proair, or proventil or ventolin or generic albuterol inhaler)      busPIRone (BUSPAR) 10 MG tablet Take 10 mg by mouth 2 times daily as needed (anxiety, difficulty breathing)       calcium carbonate (OS-MAMIE) 1500 (600 Ca) MG tablet Take 600 mg by mouth every evening       diphenhydrAMINE (BENADRYL) 25 MG tablet Take 12.5 mg by mouth nightly as needed for sleep      dorzolamide-timolol (COSOPT) 2-0.5 % ophthalmic solution Place 1 drop into both eyes 2 times daily       latanoprost (XALATAN) 0.005 % ophthalmic solution Place 1 drop into both eyes At Bedtime.      levothyroxine (SSYNTHROID,LEVOTHROID) 100 MCG tablet Take 1 tablet by mouth daily.  Qty: 90 tablet, Refills: 3    Associated Diagnoses: Hypothyroidism      magnesium oxide (MAG-OX) 400 MG tablet Take 400 mg by mouth every evening       melatonin 3 MG tablet Take 1 mg by mouth nightly as needed for sleep      tiotropium (SPIRIVA RESPIMAT) 2.5 MCG/ACT inhaler Inhale 2 puffs into the lungs At Bedtime      Vitamin D3 (CHOLECALCIFEROL) 25 mcg (1000 units) tablet Take 1 tablet by mouth daily           STOP taking these medications       azithromycin (ZITHROMAX) 250 MG tablet Comments:   Reason for Stopping:         ipratropium - albuterol 0.5 mg/2.5 mg/3 mL (DUONEB) 0.5-2.5 (3) MG/3ML neb solution Comments:   Reason for Stopping:             Allergies   Allergies   Allergen Reactions     Latex Rash     Statins [Hmg-Coa-R Inhibitors]      Data   Most Recent 3 CBC's:  Recent Labs   Lab Test 07/19/21  0459 06/29/21  0933 06/20/21  1342   WBC 7.2 10.9 6.2   HGB 15.3 14.6 14.7   MCV 97 97 97    216 212      Most Recent 3 BMP's:  Recent Labs   Lab Test 07/21/21  1033 07/21/21  0755 07/19/21  0459 06/29/21  0933     --  136 137   POTASSIUM 4.1  --  4.3 4.1   CHLORIDE 103  --  102 104   CO2 27  --  30 29   BUN 12  --  9 13   CR 0.67  --  0.79 0.80   ANIONGAP 5  --  4 4   MAMIE 9.5  --  9.5 9.2   * 132* 111* 90     Most Recent 2 LFT's:  Recent Labs   Lab Test 06/29/21  0933 06/20/21  1342   AST 17 75*   ALT 55* 124*   ALKPHOS 55 54   BILITOTAL 0.5 0.4     Most Recent INR's and Anticoagulation Dosing History:  Anticoagulation Dose History    There is no flowsheet data to display.       Most Recent 3 Troponin's:  Recent Labs   Lab Test 07/19/21  0500 06/29/21  0933 06/20/21  1342   TROPI  --  <0.015 <0.015   TROPONIN <0.015  --   --      Most Recent Cholesterol Panel:No lab results found.  Most Recent 6 Bacteria Isolates From Any Culture (See EPIC Reports for Culture Details):No lab results found.  Most Recent TSH, T4 and A1c Labs:No lab results found.  Results for orders placed or performed during the hospital encounter of 07/19/21   XR Chest Port 1 View    Narrative    EXAM: CHEST SINGLE VIEW PORTABLE  LOCATION: [facility]  DATE/TIME: 7/19/2021 5:57 AM    INDICATION: Shortness of breath.  COMPARISON: 06/20/2021.    FINDINGS: No convincing pulmonary opacities. Normal size cardiac silhouette. Atherosclerotic calcification in the thoracic aorta.      Impression    IMPRESSION: No convincing  evidence of active cardiopulmonary disease.              Echocardiogram Complete     Value    LVEF  60%    Narrative    [id number]  [id number]  [id number]  [id number]^JUAN^DONNA     [facility]  Echocardiography Laboratory  [address] [place] [address]     Name: GUY EDWARD  MRN: [id number]  : 1945  Study Date: 2021 08:10 AM  Age: 75 yrs  Gender: Female  Patient Location: [facility]  Reason For Study: SOB  Ordering Physician: DONNA MARTINEZ  Referring Physician: TASHA POMPA  Performed By: Corey Benavides RDCS     BSA: 1.6 m2  Height: 61 in  Weight: 144 lb  HR: 83  BP: 151/72 mmHg  ______________________________________________________________________________  Procedure  Complete Portable Echo Adult. Optison (NDC #[id number]) given intravenously.  ______________________________________________________________________________  Interpretation Summary     1. The left ventricle is normal in structure, function and size. The visual  ejection fraction is estimated at 60%.  2. The right ventricle is normal in structure, function and size.  3. No valve disease.     No previous echo for comparison.  ______________________________________________________________________________  Left Ventricle  The left ventricle is normal in structure, function and size. There is normal  left ventricular wall thickness. The visual ejection fraction is estimated at  60%. Left ventricular diastolic function is indeterminate. Normal left  ventricular wall motion.     Right Ventricle  The right ventricle is normal in structure, function and size.     Atria  Normal left atrial size. Right atrial size is normal. There is no atrial shunt  seen.     Mitral Valve  The mitral valve is normal in structure and function.     Tricuspid Valve  The tricuspid valve is normal in structure and function. No tricuspid  regurgitation.     Aortic Valve  The aortic valve is normal in structure and function.     Pulmonic  Valve  The pulmonic valve is normal in structure and function.     Vessels  Normal ascending, transverse (arch), and descending aorta. The inferior vena  cava was normal in size with preserved respiratory variability.     Pericardium  There is no pericardial effusion.     Rhythm  Sinus rhythm was noted.  ______________________________________________________________________________  MMode/2D Measurements & Calculations  IVSd: 0.91 cm     LVIDd: 4.1 cm  LVIDs: 1.7 cm  LVPWd: 1.1 cm  FS: 59.6 %  LV mass(C)d: 135.5 grams  LV mass(C)dI: 82.5 grams/m2  Ao root diam: 3.1 cm  LA dimension: 3.0 cm  asc Aorta Diam: 3.0 cm  LA/Ao: 0.99  LA Volume (BP): 26.8 ml  LA Volume Index (BP): 16.3 ml/m2  RWT: 0.53     Doppler Measurements & Calculations  MV E max barbara: 66.5 cm/sec  MV A max barbara: 92.2 cm/sec  MV E/A: 0.72  MV dec time: 0.21 sec  PA acc time: 0.09 sec  E/E' av.2  Lateral E/e': 11.3  Medial E/e': 13.0     ______________________________________________________________________________  Report approved by: Socrates Garcia 2021 10:06 AM

## 2021-07-22 NOTE — PROGRESS NOTES
DATE & TIME: 7/22/2021  am shift.   Cognitive Concerns/ Orientation : A&Ox4   BEHAVIOR & AGGRESSION TOOL COLOR: Green  CIWA SCORE: NA   ABNL VS/O2: VSS on RA. Ambulated in unit, O2 sat briefly at 88%, recovered quickly to >92% most of the walk. Pt felt much better today  MOBILITY: Ind  PAIN MANAGMENT: Denies pain  DIET: Regular  BOWEL/BLADDER: Continent  ABNL LAB/BG:   DRAIN/DEVICES: PIV  TELEMETRY RHYTHM: NSR  SKIN: Bruises  TESTS/PROCEDURES:   D/C DATE: today, daughter will pick her up at 1500.   Discharge Barriers:   OTHER IMPORTANT INFO: MD/RN ROUNDING SIGNED OFF D/E SHIFT: NA

## 2021-07-22 NOTE — PROGRESS NOTES
Plan of Care: Continue to monitor and assess respiratory status.  Continue to deliver respiratory medications as ordered per protocol.      Shift Notes:  Pt remained on room air throughout the shift.  Pt given respiratory medications as ordered.  Pt states her cough became worse around 2345 and coughed up thick tan secretions.  After the nebulizer she stated she had some relief and the coughing became less frequent.

## 2021-07-22 NOTE — PLAN OF CARE
Cognitive Concerns/ Orientation : A&Ox4   BEHAVIOR & AGGRESSION TOOL COLOR: Green  CIWA SCORE: NA   ABNL VS/O2: VSS on RA  MOBILITY: Ind  PAIN MANAGMENT: Denies pain  DIET: Regular  BOWEL/BLADDER: Continent  ABNL LAB/BG:   DRAIN/DEVICES: PIV  TELEMETRY RHYTHM: NSR  SKIN: Bruises  TESTS/PROCEDURES:   D/C DATE: Pending  Discharge Barriers:   OTHER IMPORTANT INFO: Transfer from OBS. Congested cough on scheduled nebs and mucinex  MD/RN ROUNDING SIGNED OFF D/E SHIFT: NA

## 2021-07-22 NOTE — PROGRESS NOTES
A&Ox4. VSS on RA (O2 sats 90-94%). Tele: NSR. Denies any pain or N&V. Up independently in room. Tolerating a regular diet w/ good appetite. LS dim/clear/equal bilat; frequent unproductive cough. Using flutter valve & I/S. Voiding in BR; good UOP. Prn senna given for c/o constipation, last BM 7/19. On replacement protocol for Phos level 1.9, recheck tomorrow. Report given and pt transferred to station 66.

## 2021-07-23 ENCOUNTER — PATIENT OUTREACH (OUTPATIENT)
Dept: CARE COORDINATION | Facility: CLINIC | Age: 76
End: 2021-07-23

## 2021-07-23 DIAGNOSIS — Z71.89 OTHER SPECIFIED COUNSELING: ICD-10-CM

## 2021-07-26 NOTE — PROGRESS NOTES
"Clinic Care Coordination Contact  [facility]: Post-Discharge Note  SITUATION                                                      Admission:    Admission Date: 07/19/21   Reason for Admission: SOB  Discharge:   Discharge Date: 07/22/21  Discharge Diagnosis: COPD exacerbation    BACKGROUND                                                      Naina Zamora is an 75 year old female who presented with shortness of breath found to have COPD exacerbation with acute hypoxic respiratory failure    ASSESSMENT      Discharge Assessment  How are you doing now that you are home?: \" I feel michaelle good\"  How are your symptoms? (Red Flag symptoms escalate to triage hotline per guidelines): Improved  Do you feel your condition is stable enough to be safe at home until your provider visit?: Yes  Does the patient have their discharge instructions? : Yes  Does the patient have questions regarding their discharge instructions? : No  Were you started on any new medications or were there changes to any of your previous medications? : No  Does the patient have all of their medications?: Yes  Do you have questions regarding any of your medications? : No  Do you have all of your needed medical supplies or equipment (DME)?  (i.e. oxygen tank, CPAP, cane, etc.): Yes  Discharge follow-up appointment scheduled within 14 calendar days? : Yes  Discharge Follow Up Appointment Date: 08/05/21  Discharge Follow Up Appointment Scheduled with?: Primary Care Provider    Post-op  Did the patient have surgery or a procedure: No  Fever: No  Chills: No  Eating & Drinking: eating and drinking without complaints/concerns  PO Intake: regular diet  Bowel Function: normal  Date of last BM: 07/26/21  Urinary Status: voiding without complaint/concerns        PLAN                                                      Outpatient Plan:Follow up with primary care provider, Tia Barriga, within 7 days to evaluate medication change and for hospital follow- " up.  No follow up labs or test are needed      No future appointments.      For any urgent concerns, please contact our 24 hour nurse triage line: [phone] ([phone])         Warner Meredith

## 2021-08-29 ENCOUNTER — HEALTH MAINTENANCE LETTER (OUTPATIENT)
Age: 76
End: 2021-08-29

## 2021-10-24 ENCOUNTER — HEALTH MAINTENANCE LETTER (OUTPATIENT)
Age: 76
End: 2021-10-24

## 2022-10-15 ENCOUNTER — HEALTH MAINTENANCE LETTER (OUTPATIENT)
Age: 77
End: 2022-10-15

## 2023-10-29 ENCOUNTER — HEALTH MAINTENANCE LETTER (OUTPATIENT)
Age: 78
End: 2023-10-29